# Patient Record
Sex: MALE | Race: WHITE | Employment: OTHER
[De-identification: names, ages, dates, MRNs, and addresses within clinical notes are randomized per-mention and may not be internally consistent; named-entity substitution may affect disease eponyms.]

---

## 2024-02-20 ENCOUNTER — OFFICE VISIT (OUTPATIENT)
Dept: INTERNAL MEDICINE CLINIC | Facility: CLINIC | Age: 79
End: 2024-02-20

## 2024-02-20 VITALS
WEIGHT: 163 LBS | BODY MASS INDEX: 24.71 KG/M2 | TEMPERATURE: 98.7 F | HEIGHT: 68 IN | DIASTOLIC BLOOD PRESSURE: 78 MMHG | HEART RATE: 87 BPM | SYSTOLIC BLOOD PRESSURE: 130 MMHG | OXYGEN SATURATION: 97 %

## 2024-02-20 DIAGNOSIS — E05.90 HYPERTHYROIDISM: Primary | ICD-10-CM

## 2024-02-20 DIAGNOSIS — E78.2 MIXED HYPERLIPIDEMIA: ICD-10-CM

## 2024-02-20 PROBLEM — R03.0 ELEVATED BLOOD PRESSURE READING WITHOUT DIAGNOSIS OF HYPERTENSION: Status: ACTIVE | Noted: 2022-02-08

## 2024-02-20 PROBLEM — R74.8 ABNORMAL LIVER ENZYMES: Status: ACTIVE | Noted: 2018-11-06

## 2024-02-20 PROBLEM — E78.5 HYPERLIPIDEMIA: Status: ACTIVE | Noted: 2022-02-08

## 2024-02-20 PROBLEM — R73.9 HYPERGLYCEMIA: Status: ACTIVE | Noted: 2018-11-06

## 2024-02-20 RX ORDER — ATORVASTATIN CALCIUM 10 MG/1
10 TABLET, FILM COATED ORAL
COMMUNITY
Start: 2022-10-19 | End: 2024-02-20 | Stop reason: SDUPTHER

## 2024-02-20 RX ORDER — ATORVASTATIN CALCIUM 10 MG/1
TABLET, FILM COATED ORAL
Qty: 30 TABLET | Refills: 3 | Status: SHIPPED | OUTPATIENT
Start: 2024-02-20

## 2024-02-20 RX ORDER — METHIMAZOLE 5 MG/1
5 TABLET ORAL
COMMUNITY
Start: 2023-09-26

## 2024-02-20 SDOH — ECONOMIC STABILITY: HOUSING INSECURITY
IN THE LAST 12 MONTHS, WAS THERE A TIME WHEN YOU DID NOT HAVE A STEADY PLACE TO SLEEP OR SLEPT IN A SHELTER (INCLUDING NOW)?: NO

## 2024-02-20 SDOH — ECONOMIC STABILITY: FOOD INSECURITY: WITHIN THE PAST 12 MONTHS, YOU WORRIED THAT YOUR FOOD WOULD RUN OUT BEFORE YOU GOT MONEY TO BUY MORE.: NEVER TRUE

## 2024-02-20 SDOH — ECONOMIC STABILITY: FOOD INSECURITY: WITHIN THE PAST 12 MONTHS, THE FOOD YOU BOUGHT JUST DIDN'T LAST AND YOU DIDN'T HAVE MONEY TO GET MORE.: NEVER TRUE

## 2024-02-20 SDOH — ECONOMIC STABILITY: INCOME INSECURITY: HOW HARD IS IT FOR YOU TO PAY FOR THE VERY BASICS LIKE FOOD, HOUSING, MEDICAL CARE, AND HEATING?: NOT HARD AT ALL

## 2024-02-20 ASSESSMENT — ENCOUNTER SYMPTOMS
NAUSEA: 0
CONSTIPATION: 0
VOMITING: 0
ABDOMINAL PAIN: 0
SHORTNESS OF BREATH: 0
DIARRHEA: 0
WHEEZING: 0
SINUS PAIN: 0

## 2024-02-20 ASSESSMENT — PATIENT HEALTH QUESTIONNAIRE - PHQ9
1. LITTLE INTEREST OR PLEASURE IN DOING THINGS: 0
SUM OF ALL RESPONSES TO PHQ QUESTIONS 1-9: 0
SUM OF ALL RESPONSES TO PHQ QUESTIONS 1-9: 0
2. FEELING DOWN, DEPRESSED OR HOPELESS: 0
SUM OF ALL RESPONSES TO PHQ QUESTIONS 1-9: 0
SUM OF ALL RESPONSES TO PHQ9 QUESTIONS 1 & 2: 0
SUM OF ALL RESPONSES TO PHQ QUESTIONS 1-9: 0

## 2024-02-20 NOTE — PROGRESS NOTES
Eloy was seen today for established new doctor.    Diagnoses and all orders for this visit:    Hyperthyroidism    Mixed hyperlipidemia  -     Lipid Panel; Future  -     Comprehensive Metabolic Panel; Future  -     CBC; Future    Other orders  -     atorvastatin (LIPITOR) 10 MG tablet; Takes  one tablet three times per week          Eloy Rooney is a 78 y.o. male    Chief Complaint   Patient presents with    Established New Doctor     His PCP retired     Doing pretty well  Is active socially and physically   Recent lumbar back injections and now rt hip pain. Going to see hip doctor soon.    Wt Readings from Last 3 Encounters:   24 73.9 kg (163 lb)       No results found for any previous visit.        Past Medical History:   Diagnosis Date    Graves' disease     Hyperthyroidism        History reviewed. No pertinent family history.     Social History     Socioeconomic History    Marital status:      Spouse name: Not on file    Number of children: Not on file    Years of education: Not on file    Highest education level: Not on file   Occupational History    Not on file   Tobacco Use    Smoking status: Former     Types: Cigars     Start date: 1969     Quit date: 2009     Years since quittin.5    Smokeless tobacco: Never   Vaping Use    Vaping Use: Never used   Substance and Sexual Activity    Alcohol use: Not Currently     Comment: 15 years    Drug use: Never    Sexual activity: Not on file   Other Topics Concern    Not on file   Social History Narrative    Not on file     Social Determinants of Health     Financial Resource Strain: Low Risk  (2024)    Overall Financial Resource Strain (CARDIA)     Difficulty of Paying Living Expenses: Not hard at all   Food Insecurity: No Food Insecurity (2024)    Hunger Vital Sign     Worried About Running Out of Food in the Last Year: Never true     Ran Out of Food in the Last Year: Never true   Transportation Needs: Unknown (2024)

## 2024-02-28 ENCOUNTER — TELEPHONE (OUTPATIENT)
Dept: INTERNAL MEDICINE CLINIC | Facility: CLINIC | Age: 79
End: 2024-02-28

## 2024-02-28 DIAGNOSIS — E05.90 HYPERTHYROIDISM: Primary | ICD-10-CM

## 2024-02-29 ENCOUNTER — NURSE ONLY (OUTPATIENT)
Dept: INTERNAL MEDICINE CLINIC | Facility: CLINIC | Age: 79
End: 2024-02-29

## 2024-02-29 DIAGNOSIS — E05.90 HYPERTHYROIDISM: ICD-10-CM

## 2024-02-29 LAB — TSH, 3RD GENERATION: 2.86 UIU/ML (ref 0.36–3.74)

## 2024-03-28 ENCOUNTER — OFFICE VISIT (OUTPATIENT)
Dept: ORTHOPEDIC SURGERY | Age: 79
End: 2024-03-28
Payer: MEDICARE

## 2024-03-28 VITALS — BODY MASS INDEX: 25.65 KG/M2 | WEIGHT: 163.4 LBS | HEIGHT: 67 IN

## 2024-03-28 DIAGNOSIS — M16.11 PRIMARY OSTEOARTHRITIS OF RIGHT HIP: ICD-10-CM

## 2024-03-28 DIAGNOSIS — M25.551 RIGHT HIP PAIN: Primary | ICD-10-CM

## 2024-03-28 PROCEDURE — 4004F PT TOBACCO SCREEN RCVD TLK: CPT | Performed by: ORTHOPAEDIC SURGERY

## 2024-03-28 PROCEDURE — G8419 CALC BMI OUT NRM PARAM NOF/U: HCPCS | Performed by: ORTHOPAEDIC SURGERY

## 2024-03-28 PROCEDURE — 99204 OFFICE O/P NEW MOD 45 MIN: CPT | Performed by: ORTHOPAEDIC SURGERY

## 2024-03-28 PROCEDURE — 1123F ACP DISCUSS/DSCN MKR DOCD: CPT | Performed by: ORTHOPAEDIC SURGERY

## 2024-03-28 PROCEDURE — G8484 FLU IMMUNIZE NO ADMIN: HCPCS | Performed by: ORTHOPAEDIC SURGERY

## 2024-03-28 PROCEDURE — G8427 DOCREV CUR MEDS BY ELIG CLIN: HCPCS | Performed by: ORTHOPAEDIC SURGERY

## 2024-03-28 RX ORDER — CELECOXIB 200 MG/1
200 CAPSULE ORAL 2 TIMES DAILY
Qty: 60 CAPSULE | Refills: 3 | Status: SHIPPED | OUTPATIENT
Start: 2024-03-28

## 2024-03-28 NOTE — PROGRESS NOTES
anti-inflammatory drugs (NSAIDs). We discussed risks associated with their use, including GI tract or other bleeding, and also some cardiac risk. Instructions were given to discontinue the NSAID if there is any sign of GI bleed, chest pain, or shortness of breath.    ----HECTOR - Today we discussed right hip replacement surgery.  They are aware of the 1% risk of dislocation and 1% risk of infection.  They were also informed of the possibility of stroke, heart attack and blood clot-any of these which could result in further hospitalization or death. I reviewed the procedure as well as the pre and post-operative process at length and written information was provided for further review. Implant selection options were discussed with the patient as well. This will be scheduled and arranged.    4--this is a chronic illness/condition with exacerbation

## 2024-04-01 DIAGNOSIS — M16.11 PRIMARY OSTEOARTHRITIS OF RIGHT HIP: Primary | ICD-10-CM

## 2024-04-02 ENCOUNTER — TELEPHONE (OUTPATIENT)
Dept: ORTHOPEDIC SURGERY | Age: 79
End: 2024-04-02

## 2024-04-02 NOTE — TELEPHONE ENCOUNTER
Answered all patients questions regarding appts for surgery & information about surgery. Patient will update us if anymore questions.

## 2024-04-05 ENCOUNTER — PREP FOR PROCEDURE (OUTPATIENT)
Dept: ORTHOPEDIC SURGERY | Age: 79
End: 2024-04-05

## 2024-04-05 DIAGNOSIS — M16.11 PRIMARY OSTEOARTHRITIS OF RIGHT HIP: Primary | ICD-10-CM

## 2024-04-05 RX ORDER — SODIUM CHLORIDE 0.9 % (FLUSH) 0.9 %
5-40 SYRINGE (ML) INJECTION PRN
Status: CANCELLED | OUTPATIENT
Start: 2024-04-05

## 2024-04-05 RX ORDER — SODIUM CHLORIDE 0.9 % (FLUSH) 0.9 %
5-40 SYRINGE (ML) INJECTION EVERY 12 HOURS SCHEDULED
Status: CANCELLED | OUTPATIENT
Start: 2024-04-05

## 2024-04-05 RX ORDER — ACETAMINOPHEN 325 MG/1
1000 TABLET ORAL ONCE
Status: CANCELLED | OUTPATIENT
Start: 2024-04-05 | End: 2024-04-05

## 2024-04-05 RX ORDER — SODIUM CHLORIDE 9 MG/ML
INJECTION, SOLUTION INTRAVENOUS PRN
Status: CANCELLED | OUTPATIENT
Start: 2024-04-05

## 2024-04-08 ENCOUNTER — HOSPITAL ENCOUNTER (OUTPATIENT)
Dept: REHABILITATION | Age: 79
Discharge: HOME | End: 2024-04-11
Payer: MEDICARE

## 2024-04-08 ENCOUNTER — HOSPITAL ENCOUNTER (OUTPATIENT)
Dept: SURGERY | Age: 79
Discharge: HOME | End: 2024-04-11
Payer: MEDICARE

## 2024-04-08 VITALS
RESPIRATION RATE: 16 BRPM | HEIGHT: 67 IN | TEMPERATURE: 97.2 F | WEIGHT: 163.5 LBS | HEART RATE: 81 BPM | SYSTOLIC BLOOD PRESSURE: 146 MMHG | DIASTOLIC BLOOD PRESSURE: 83 MMHG | OXYGEN SATURATION: 96 % | BODY MASS INDEX: 25.66 KG/M2

## 2024-04-08 DIAGNOSIS — M16.11 PRIMARY OSTEOARTHRITIS OF RIGHT HIP: ICD-10-CM

## 2024-04-08 LAB
ANION GAP SERPL CALC-SCNC: 7 MMOL/L (ref 2–11)
APTT PPP: 27.3 SEC (ref 23.3–37.4)
BASOPHILS # BLD: 0 K/UL (ref 0–0.2)
BASOPHILS NFR BLD: 1 % (ref 0–2)
BUN SERPL-MCNC: 7 MG/DL (ref 8–23)
CALCIUM SERPL-MCNC: 9.6 MG/DL (ref 8.3–10.4)
CHLORIDE SERPL-SCNC: 102 MMOL/L (ref 103–113)
CO2 SERPL-SCNC: 30 MMOL/L (ref 21–32)
CREAT SERPL-MCNC: 0.74 MG/DL (ref 0.8–1.5)
DIFFERENTIAL METHOD BLD: ABNORMAL
EKG ATRIAL RATE: 79 BPM
EKG DIAGNOSIS: NORMAL
EKG P AXIS: 40 DEGREES
EKG P-R INTERVAL: 164 MS
EKG Q-T INTERVAL: 360 MS
EKG QRS DURATION: 76 MS
EKG QTC CALCULATION (BAZETT): 412 MS
EKG R AXIS: 48 DEGREES
EKG T AXIS: 40 DEGREES
EKG VENTRICULAR RATE: 79 BPM
EOSINOPHIL # BLD: 0.1 K/UL (ref 0–0.8)
EOSINOPHIL NFR BLD: 1 % (ref 0.5–7.8)
ERYTHROCYTE [DISTWIDTH] IN BLOOD BY AUTOMATED COUNT: 13.9 % (ref 11.9–14.6)
EST. AVERAGE GLUCOSE BLD GHB EST-MCNC: 105 MG/DL
GLUCOSE SERPL-MCNC: 109 MG/DL (ref 65–100)
HBA1C MFR BLD: 5.3 % (ref 4.8–5.6)
HCT VFR BLD AUTO: 54 % (ref 41.1–50.3)
HGB BLD-MCNC: 18.7 G/DL (ref 13.6–17.2)
IMM GRANULOCYTES # BLD AUTO: 0 K/UL (ref 0–0.5)
IMM GRANULOCYTES NFR BLD AUTO: 0 % (ref 0–5)
INR PPP: 1.1
LYMPHOCYTES # BLD: 1.2 K/UL (ref 0.5–4.6)
LYMPHOCYTES NFR BLD: 19 % (ref 13–44)
MCH RBC QN AUTO: 30.9 PG (ref 26.1–32.9)
MCHC RBC AUTO-ENTMCNC: 34.6 G/DL (ref 31.4–35)
MCV RBC AUTO: 89.3 FL (ref 82–102)
MONOCYTES # BLD: 0.5 K/UL (ref 0.1–1.3)
MONOCYTES NFR BLD: 7 % (ref 4–12)
NEUTS SEG # BLD: 4.7 K/UL (ref 1.7–8.2)
NEUTS SEG NFR BLD: 72 % (ref 43–78)
NRBC # BLD: 0 K/UL (ref 0–0.2)
PLATELET # BLD AUTO: 219 K/UL (ref 150–450)
PMV BLD AUTO: 10.7 FL (ref 9.4–12.3)
POTASSIUM SERPL-SCNC: 4.2 MMOL/L (ref 3.5–5.1)
PROTHROMBIN TIME: 13.4 SEC (ref 11.3–14.9)
RBC # BLD AUTO: 6.05 M/UL (ref 4.23–5.6)
SODIUM SERPL-SCNC: 139 MMOL/L (ref 136–146)
WBC # BLD AUTO: 6.5 K/UL (ref 4.3–11.1)

## 2024-04-08 PROCEDURE — 93010 ELECTROCARDIOGRAM REPORT: CPT | Performed by: INTERNAL MEDICINE

## 2024-04-08 PROCEDURE — 94760 N-INVAS EAR/PLS OXIMETRY 1: CPT

## 2024-04-08 PROCEDURE — 93005 ELECTROCARDIOGRAM TRACING: CPT | Performed by: ANESTHESIOLOGY

## 2024-04-08 PROCEDURE — 85025 COMPLETE CBC W/AUTO DIFF WBC: CPT

## 2024-04-08 PROCEDURE — 85610 PROTHROMBIN TIME: CPT

## 2024-04-08 PROCEDURE — 80048 BASIC METABOLIC PNL TOTAL CA: CPT

## 2024-04-08 PROCEDURE — 83036 HEMOGLOBIN GLYCOSYLATED A1C: CPT

## 2024-04-08 PROCEDURE — 97161 PT EVAL LOW COMPLEX 20 MIN: CPT

## 2024-04-08 PROCEDURE — 87641 MR-STAPH DNA AMP PROBE: CPT

## 2024-04-08 PROCEDURE — 85730 THROMBOPLASTIN TIME PARTIAL: CPT

## 2024-04-08 ASSESSMENT — PROMIS GLOBAL HEALTH SCALE
WHO IS THE PERSON COMPLETING THE PROMIS V1.1 SURVEY?: SELF
SUM OF RESPONSES TO QUESTIONS 3, 6, 7, & 8: 13
SUM OF RESPONSES TO QUESTIONS 2, 4, 5, & 10: 4
IN GENERAL, HOW WOULD YOU RATE YOUR SATISFACTION WITH YOUR SOCIAL ACTIVITIES AND RELATIONSHIPS [ON A SCALE OF 1 (POOR) TO 5 (EXCELLENT)]?: POOR
IN GENERAL, WOULD YOU SAY YOUR QUALITY OF LIFE IS...[ON A SCALE OF 1 (POOR) TO 5 (EXCELLENT)]: POOR
IN THE PAST 7 DAYS, HOW WOULD YOU RATE YOUR FATIGUE ON AVERAGE [ON A SCALE FROM 1 (NONE) TO 5 (VERY SEVERE)]?: VERY SEVERE
IN GENERAL, WOULD YOU SAY YOUR HEALTH IS...[ON A SCALE OF 1 (POOR) TO 5 (EXCELLENT)]: FAIR
IN THE PAST 7 DAYS, HOW WOULD YOU RATE YOUR PAIN ON AVERAGE [ON A SCALE FROM 0 (NO PAIN) TO 10 (WORST IMAGINABLE PAIN)]?: 10 WORST IMAGINABLE PAIN
IN GENERAL, HOW WOULD YOU RATE YOUR PHYSICAL HEALTH [ON A SCALE OF 1 (POOR) TO 5 (EXCELLENT)]?: POOR
TO WHAT EXTENT ARE YOU ABLE TO CARRY OUT YOUR EVERYDAY PHYSICAL ACTIVITIES SUCH AS WALKING, CLIMBING STAIRS, CARRYING GROCERIES, OR MOVING A CHAIR [ON A SCALE OF 1 (NOT AT ALL) TO 5 (COMPLETELY)]?: NOT AT ALL
IN GENERAL, HOW WOULD YOU RATE YOUR MENTAL HEALTH, INCLUDING YOUR MOOD AND YOUR ABILITY TO THINK [ON A SCALE OF 1 (POOR) TO 5 (EXCELLENT)]?: POOR
IN THE PAST 7 DAYS, HOW OFTEN HAVE YOU BEEN BOTHERED BY EMOTIONAL PROBLEMS, SUCH AS FEELING ANXIOUS, DEPRESSED, OR IRRITABLE [ON A SCALE FROM 1 (NEVER) TO 5 (ALWAYS)]?: ALWAYS
IN GENERAL, PLEASE RATE HOW WELL YOU CARRY OUT YOUR USUAL SOCIAL ACTIVITIES (INCLUDES ACTIVITIES AT HOME, AT WORK, AND IN YOUR COMMUNITY, AND RESPONSIBILITIES AS A PARENT, CHILD, SPOUSE, EMPLOYEE, FRIEND, ETC) [ON A SCALE OF 1 (POOR) TO 5 (EXCELLENT)]?: POOR
HOW IS THE PROMIS V1.1 BEING ADMINISTERED?: PAPER

## 2024-04-08 ASSESSMENT — HOOS JR
SITTING: MODERATE
HOOS JR RAW SCORE: 8
WALKING ON UNEVEN SURFACE: MILD
HOOS JR RAW SCORE: 8
BENDING TO THE FLOOR TO PICK UP OBJECT: MILD
HOOS JR TOTAL INTERVAL SCORE: 64.664
LYING IN BED (TURNING OVER, MAINTAINING HIP POSITION): MODERATE
GOING UP OR DOWN STAIRS: MILD
RISING FROM SITTING: MILD

## 2024-04-08 ASSESSMENT — PAIN SCALES - GENERAL
PAINLEVEL_OUTOF10: 10
PAINLEVEL_OUTOF10: 4

## 2024-04-08 ASSESSMENT — PAIN DESCRIPTION - ORIENTATION
ORIENTATION: RIGHT
ORIENTATION: RIGHT

## 2024-04-08 ASSESSMENT — PAIN DESCRIPTION - LOCATION
LOCATION: HIP
LOCATION: HIP

## 2024-04-08 ASSESSMENT — PULMONARY FUNCTION TESTS
FEV1 (LITERS): 2.93
FEV1 (%PREDICTED): 115

## 2024-04-08 NOTE — PROGRESS NOTES
Hyperthyroidism, Lumbar radiculopathy, Polycythemia, Primary osteoarthritis of right hip, and Vision impairment.  Mr. Rooney  has a past surgical history that includes eye surgery (Left); Tonsillectomy; Appendectomy; sinus surgery; Facial reconstruction surgery (Right); and Multiple tooth extractions.    Allergies:  Epinephrine and Heparin    Current Medications:  Refer to pre-assessment nursing note    Home Set-Up/Prior Level of Function:  Lives With: Spouse  Home Layout: One level  Home Access: Stairs to enter without rails  Bathroom Shower/Tub: Tub/Shower unit  Home Equipment: Cane    Dominant Side:  Dominant Hand: : Left      Current Functional Status:   Ambulation:  [] Independent  [x] Walk Indoors Only  [] Walk Outdoors  [] Use Assistive Device  [] Use Wheelchair Only Dressing:  [] Independent  Requires Assistance from Someone for:  [] Sock/Shoes  [] Pants  [x] Everything   Bathing/Showering:   [] Independent  [] Requires Assistance from Someone  [x] Sponge Bath Only Household Activities:  [] Routine house and yard work  [] Light Housework Only  [x] None     Objective:   PAIN:   Pre-Treatment Pain  Pain Assessment: 0-10  Pain Level: 10  Pain Location: Hip  Pain Orientation: Right    Post Treatment: 10    Outcome Measure:   HOOS-JR:  Total Raw Score (0-24 Scale): 8  JR Neftaly. Hip Survey 1. Going up or down stairs 2. Walking on an uneven surface 3. Rising from sitting  4. Bending to the floor/ an object 5. Lying in bed (turning over, maintaining hip position) 6. Sitting  HOOS Jr. Raw Score   4/8/2024   3:52 PM 1 1 1 1 2 2 8        KOOS-JR:          No data to display                 LOWER EXTREMITY GROSS EVALUATION:  RIGHT LE   Within Functional Limits   Abnormal   Comments   Strength [] []  4+/5@hip   Range of Motion [x] []        LEFT LE Within Functional Limits   Abnormal   Comments   Strength [x] []     Range of Motion [x] []       UPPER EXTREMITY GROSS EVALUATION:     Within Functional Limits

## 2024-04-09 DIAGNOSIS — M16.11 PRIMARY OSTEOARTHRITIS OF RIGHT HIP: ICD-10-CM

## 2024-04-09 LAB
MRSA DNA SPEC QL NAA+PROBE: NOT DETECTED
S AUREUS CPE NOSE QL NAA+PROBE: NOT DETECTED

## 2024-04-09 RX ORDER — CELECOXIB 200 MG/1
200 CAPSULE ORAL 2 TIMES DAILY
Qty: 60 CAPSULE | Refills: 3 | Status: SHIPPED | OUTPATIENT
Start: 2024-04-09

## 2024-04-09 NOTE — PROGRESS NOTES
04/08/24 1130   Treatment   Treatment Type Bedside spirometry   Oxygen Therapy/Pulse Ox   O2 Therapy Room air   Pulse 87   SpO2 97 %   Pulse Oximeter Device Mode Intermittent   $Pulse Oximeter $Spot check (single)   Bedside Spirometry   FEV-1/Actual (Liters) 2.93 Liters   FEV-1/Predicted (Liters) 115 Liters     Initial respiratory Assessment completed with pt. Pt was interviewed and evaluated in [facility] prior to [place].  Patient ID:  Eloy Rooney  [id number]  78 y.o.  1945  Surgeon: Dr. Shipley  Date of Surgery: [unfilled]6/3/2024  Procedure: Total Right Hip Arthroplasty  Primary Care Physician: Meng Yun,  [phone]  Specialists:    Pt taught proper COUGH technique  IS REVIEWED WITH PT AS WELL AS BENEFITS OF USING IS IN SEDENTARY PTS.  DIAPHRAGMATIC BREATHING EXERCISE INSTRUCTIONS GIVEN    History of smoking:   QUIT DRINKING 2/3/2023 PER PT AND STILL SMOKING SOME WITH FILTER  SMOKING CESSATION GIVEN TO PT                 Quit date:         Secondhand smoke:DENIES    Past procedures with Oxygen desaturation or delayed awakening:DENIES     Respiratory history:DENIES SOB                                HX OF PNA AT AGE 16  HX OF PERSISTENT COUGH                                   Respiratory meds:  DENIES    FAMILY PRESENT:            WIFE  PAST SLEEP STUDY:                      DENIES  HX OF KIRTI:                        YES                     KIRTI assessment:     DANGERS OF UNTREATED KIRTI EXPLAINED TO PT.                                          SLEEPS ON SIDE       &      BACK         &       STOMACH  PHYSICAL EXAM   Body mass index is 25.99 kg/m².   Vitals:    04/08/24 1309   BP: (!) 146/83   Pulse: 81   Resp: 16   Temp: 97.2 °F (36.2 °C)   SpO2: 96%     Neck circumference: 40     cm    Loud snoring:                                                 YES            Witnessed apnea or wakening gasping or choking:        DENIES       Awakens with headaches:                                      
  How to Use Your Incentive Spirometer       About Your Incentive Spirometer  An incentive spirometer is a device that will expand your lungs by helping you to breathe more deeply and fully. The parts of your incentive spirometer are labeled in Figure 1.    Using your incentive spirometer  When you're using your incentive spirometer, make sure to breathe through your mouth. If you breathe through your nose, the incentive spirometer won't work properly. You can hold your nose if you have trouble. DO NOT BLOW INTO THE DEVICE. If you feel dizzy at any time, stop and rest. Try again at a later time.  Sit upright in a chair or in bed. Hold the incentive spirometer at eye level.   Put the mouthpiece in your mouth and close your lips tightly around it. Slowly breathe out (exhale) completely.  Breathe in (inhale) slowly through your mouth as deeply as you can. As you take the breath, you will see the piston rise inside the large column. While the piston rises, the indicator on the right should move upwards. It should stay in between the 2 arrows (see Figure 1).  Try to get the piston as high as you can, while keeping the indicator between the arrows. If the indicator doesn't stay between the arrows, you're breathing either too fast or too slow.  When you get it as high as you can, hold your breath for 10 seconds, or as long as possible. While you're holding your breath, the piston will slowly fall to the base of the spirometer.  Once the piston reaches the bottom of the spirometer, breathe out slowly through your mouth. Rest for a few seconds.  Repeat 10 times. Try to get the piston to the same level with each breath.  After each set of 10 breaths, try to cough as coughing will help loosen or clear any mucus in your lungs.  Put the marker at the level the piston reached on your incentive spirometer. This will be your goal next time.  Repeat these steps every hour that you're awake.  Cover the mouthpiece of the incentive 
  Patient verified name and .    Order for consent not found in EHR and unable to match consent with case posting; patient verified.     Type 3 surgery, [facility] assessment complete.    Labs per surgeon: CBC,BMP, PT/INR, Hgb A1c ; results are within anesthesia guidelines.  Labs per anesthesia protocol: no additional  EKG:completed today per protocol and within anesthesia guidelines    MRSA/MSSA swab collected; pharmacy to review and dose antibiotic as appropriate.     Hospital approved surgical skin cleanser and instructions to return bottle on DOS given per hospital policy.    Patient provided with handouts including Guide to Surgery, Pain Management, Hand Hygiene, Blood Transfusion Education, and [facility] Brochure.    Patient answered medical/surgical history questions at their best of ability. All prior to admission medications documented in [facility]. Original medication prescription bottle not visualized during patient appointment.     Patient instructed to hold all vitamins 3 weeks prior to surgery and NSAIDS 5 days prior to surgery.     Patient teach back successful and patient demonstrates knowledge of instruction.     
 Latest Reference Range & Units 04/08/24 12:27   Sodium 136 - 146 mmol/L 139   Potassium 3.5 - 5.1 mmol/L 4.2   Chloride 103 - 113 mmol/L 102 (L)   CO2 21 - 32 mmol/L 30   BUN,BUNPL 8 - 23 MG/DL 7 (L)   Creatinine 0.8 - 1.5 MG/DL 0.74 (L)   Anion Gap 2 - 11 mmol/L 7   Est, Glom Filt Rate >60 ml/min/1.73m2 >90   Glucose, Random 65 - 100 mg/dL 109 (H)   CALCIUM, SERUM, 869000 8.3 - 10.4 MG/DL 9.6   Hemoglobin A1C 4.8 - 5.6 % 5.3   eAG (mg/dL) mg/dL 105   WBC 4.3 - 11.1 K/uL 6.5   RBC 4.23 - 5.6 M/uL 6.05 (H)   Hemoglobin Quant 13.6 - 17.2 g/dL 18.7 (H)   Hematocrit 41.1 - 50.3 % 54.0 (H)   MCV 82.0 - 102.0 FL 89.3   MCH 26.1 - 32.9 PG 30.9   MCHC 31.4 - 35.0 g/dL 34.6   MPV 9.4 - 12.3 FL 10.7   RDW 11.9 - 14.6 % 13.9   Platelet Count 150 - 450 K/uL 219   Neutrophils, Automated 43 - 78 % 72   Lymphocyte % 13 - 44 % 19   Monocytes % 4.0 - 12.0 % 7   Eosinophils % 0.5 - 7.8 % 1   Basophils % 0.0 - 2.0 % 1   Neutrophils Absolute 1.7 - 8.2 K/UL 4.7   Lymphocytes Absolute 0.5 - 4.6 K/UL 1.2   Monocytes Absolute 0.1 - 1.3 K/UL 0.5   Eosinophils Absolute 0.0 - 0.8 K/UL 0.1   Basophils Absolute 0.0 - 0.2 K/UL 0.0   Differential Type -   AUTOMATED   Immature Granulocytes % 0.0 - 5.0 % 0   Nucleated Red Blood Cells 0.0 - 0.2 K/uL 0.00   Immature Granulocytes Absolute 0.0 - 0.5 K/UL 0.0   Prothrombin Time 11.3 - 14.9 sec 13.4   INR -   1.1   APTT 23.3 - 37.4 SEC 27.3   (L): Data is abnormally low  (H): Data is abnormally high  
PLEASE CONTINUE TAKING ALL PRESCRIPTION MEDICATIONS UP TO THE DAY OF SURGERY UNLESS OTHERWISE DIRECTED BELOW.    DISCONTINUE all vitamins, herbals and supplements 3 weeks prior to surgery. DISCONTINUE Non-Steroidal Anti-Inflammatory (NSAIDS) such as Advil, Ibuprofen, Motrin, Naproxen and Aleve 5 days prior to surgery.       Home Medications to take  the day of surgery    Methimazole           Home Medications to Hold- please continue all other medications except these.            Comments   On the day before surgery please take Acetaminophen 1000mg in the morning and then again before bed. You may substitute for Tylenol 650 mg.      Bring Dynahex wash and Incentive Spirometer with you to hospital on the day of surgery.            Please do not bring home medications with you on the day of surgery unless otherwise directed by your nurse.  If you are instructed to bring home medications, please give them to your nurse as they will be administered by the nursing staff.    If you have any questions, please call [facility] [phone].    A copy of this note was provided to the patient for reference.    
Reactions    Epinephrine Other (See Comments)     Passed out     Heparin Other (See Comments)     Avoids heparin/ family history of uncle who had adverse reaction to Heparin          Objective:     Physical Exam:   Patient Vitals for the past 24 hrs:   Temp Pulse Resp BP SpO2   04/08/24 1309 97.2 °F (36.2 °C) 81 16 (!) 146/83 96 %   04/08/24 1130 -- 87 -- -- 97 %       ECG:    Encounter Date: 04/08/24   EKG 12 Lead   Result Value    Ventricular Rate 79    Atrial Rate 79    P-R Interval 164    QRS Duration 76    Q-T Interval 360    QTc Calculation (Bazett) 412    P Axis 40    R Axis 48    T Axis 40    Diagnosis      Normal sinus rhythm with sinus arrhythmia  Normal ECG  No previous ECGs available  Confirmed by BRENDON POLLARD ()MAYTE ([id number]) on 4/8/2024 12:55:57 PM         Data Review:   Labs:   Recent Results (from the past 24 hour(s))   EKG 12 Lead    Collection Time: 04/08/24 12:09 PM   Result Value Ref Range    Ventricular Rate 79 BPM    Atrial Rate 79 BPM    P-R Interval 164 ms    QRS Duration 76 ms    Q-T Interval 360 ms    QTc Calculation (Bazett) 412 ms    P Axis 40 degrees    R Axis 48 degrees    T Axis 40 degrees    Diagnosis       Normal sinus rhythm with sinus arrhythmia  Normal ECG  No previous ECGs available  Confirmed by BRENDON POLLARD ()MAYTE ([id number]) on 4/8/2024 12:55:57 PM     Protime-INR    Collection Time: 04/08/24 12:27 PM   Result Value Ref Range    Protime 13.4 11.3 - 14.9 sec    INR 1.1     Hemoglobin A1c    Collection Time: 04/08/24 12:27 PM   Result Value Ref Range    Hemoglobin A1C 5.3 4.8 - 5.6 %    Estimated Avg Glucose 105 mg/dL   CBC with Auto Differential    Collection Time: 04/08/24 12:27 PM   Result Value Ref Range    WBC 6.5 4.3 - 11.1 K/uL    RBC 6.05 (H) 4.23 - 5.6 M/uL    Hemoglobin 18.7 (H) 13.6 - 17.2 g/dL    Hematocrit 54.0 (H) 41.1 - 50.3 %    MCV 89.3 82.0 - 102.0 FL    MCH 30.9 26.1 - 32.9 PG    MCHC 34.6 31.4 - 35.0 g/dL    RDW 13.9 11.9 - 14.6 %    Platelets 219

## 2024-04-09 NOTE — TELEPHONE ENCOUNTER
He is having surgery May 3. He went to [facility] [place] and took his meds with him. He says the Celebrex did not make it back home with him. He is asking to speak to Gabriella about this. He thinks he needs the Celebrex.   He does use [facility] in [place].

## 2024-04-26 ENCOUNTER — OFFICE VISIT (OUTPATIENT)
Dept: ORTHOPEDIC SURGERY | Age: 79
End: 2024-04-26

## 2024-04-26 DIAGNOSIS — M16.11 PRIMARY OSTEOARTHRITIS OF RIGHT HIP: Primary | ICD-10-CM

## 2024-04-26 DIAGNOSIS — Z01.818 ENCOUNTER FOR PRE-OPERATIVE EXAMINATION: ICD-10-CM

## 2024-04-26 PROCEDURE — PREOPEXAM PRE-OP EXAM: Performed by: PHYSICIAN ASSISTANT

## 2024-04-26 NOTE — H&P (VIEW-ONLY)
[facility]  Pre Operative History and Physical Exam    Patient ID:  Eloy Rooney  [id number]  78 y.o.  1945    Today: April 26, 2024           CC:  Right hip pain    HPI:   The patient has end stage arthritis of the right hip. The patient was evaluated and examined during a consultation prior to this office visit.  There have been no changes to the patient's orthopedic condition since the initial consultation. The patient has failed previous conservative treatment for this condition including antiinflammatories , and lifestyle modifications. The necessity for joint replacement is present. The patient will be admitted the day of surgery for right hip replacement    Past Medical/Surgical History:  Past Medical History:   Diagnosis Date    Graves' disease     Hearing loss     left ear    Hyperlipidemia     no meds    Hyperthyroidism     Lumbar radiculopathy     Polycythemia     Primary osteoarthritis of right hip     Vision impairment     \"splintered vision\" r/t thyroid disease     Past Surgical History:   Procedure Laterality Date    APPENDECTOMY      EYE SURGERY Left     FACIAL RECONSTRUCTION SURGERY Right     MULTIPLE TOOTH EXTRACTIONS      all upper teeth removed    SINUS SURGERY      TONSILLECTOMY          Allergies:   Allergies   Allergen Reactions    Epinephrine Other (See Comments)     Passed out     Heparin Other (See Comments)     Avoids heparin/ family history of uncle who had adverse reaction to Heparin        Physical Exam:   General: NAD, Alert, Oriented, Appears their stated age     HEENT: NC/AT    Skin: No rashes, lesions or wounds seen      Psych: normal affect      Heart: Regular Rate, Rhythm     Lungs: unlabored respirations, no wheezing    Abdomen: Soft and non-distended     Ortho: Pain with limited ROM of the right hip    Neuro: no focal defects, moving extremities equally    Lymph: no lymphadenopathy     Meds:   Current Outpatient Medications   Medication Sig

## 2024-04-26 NOTE — PROGRESS NOTES
K/UL Final    Monocytes Absolute 04/08/2024 0.5  0.1 - 1.3 K/UL Final    Eosinophils Absolute 04/08/2024 0.1  0.0 - 0.8 K/UL Final    Basophils Absolute 04/08/2024 0.0  0.0 - 0.2 K/UL Final    Immature Granulocytes Absolute 04/08/2024 0.0  0.0 - 0.5 K/UL Final    Sodium 04/08/2024 139  136 - 146 mmol/L Final    Potassium 04/08/2024 4.2  3.5 - 5.1 mmol/L Final    Chloride 04/08/2024 102 (L)  103 - 113 mmol/L Final    CO2 04/08/2024 30  21 - 32 mmol/L Final    Anion Gap 04/08/2024 7  2 - 11 mmol/L Final    Glucose 04/08/2024 109 (H)  65 - 100 mg/dL Final    Comment: 47 - 60 mg/dl Consistent with, but not fully diagnostic of hypoglycemia.  101 - 125 mg/dl Impaired fasting glucose/consistent with pre-diabetes mellitus  > 126 mg/dl Fasting glucose consistent with overt diabetes mellitus      BUN 04/08/2024 7 (L)  8 - 23 MG/DL Final    Creatinine 04/08/2024 0.74 (L)  0.8 - 1.5 MG/DL Final    Est, Glom Filt Rate 04/08/2024 >90  >60 ml/min/1.73m2 Final    Comment:    Pediatric calculator link: https://www.kidney.org/professionals/kdoqi/gfr_calculatorped     These results are not intended for use in patients <18 years of age.     eGFR results are calculated without a race factor using  the 2021 CKD-EPI equation. Careful clinical correlation is recommended, particularly when comparing to results calculated using previous equations.  The CKD-EPI equation is less accurate in patients with extremes of muscle mass, extra-renal metabolism of creatinine, excessive creatine ingestion, or following therapy that affects renal tubular secretion.      Calcium 04/08/2024 9.6  8.3 - 10.4 MG/DL Final    APTT 04/08/2024 27.3  23.3 - 37.4 SEC Final    Comment: Heparin Therapeutic Range = 74 - 123 seconds  In addition to factor deficiency, monitoring heparin therapy, etc., evaluation of a prolonged aPTT result should include consideration of preanalytic variables such as heparin flush contamination, specimen integrity issues, etc.  PLEASE

## 2024-05-02 ENCOUNTER — ANESTHESIA EVENT (OUTPATIENT)
Dept: SURGERY | Age: 79
End: 2024-05-02
Payer: MEDICARE

## 2024-05-02 NOTE — DISCHARGE INSTRUCTIONS
shower 24 to 48 hours after surgery. Pat the incision dry. Don't swim or take a bath for the first 2 weeks, or until your doctor tells you it is okay.   Exercise    Your physical therapist will teach you exercises to do at home. Always do them as your therapist tells you.     Avoid activities where you might fall.   Ice and elevation    For pain, put ice or a cold pack on the area for 10 to 20 minutes at a time. Put a thin cloth between the ice and your skin. If your doctor recommended cold therapy using a portable machine, follow the instructions that came with the machine.     Your ankle may swell for about 3 months. Prop up your ankle when you ice it or anytime you sit or lie down. Try to keep it above the level of your heart. This will help reduce swelling.   Other instructions    Wear compression stockings if your doctor told you to. These may help to prevent blood clots. Your doctor will tell you how long you need to keep wearing the compression stockings.     Try to prevent falls. To avoid falling:  Arrange furniture so that you will not trip on it.  Get rid of throw rugs, and move electrical cords out of the way.  Walk only in areas with plenty of light.  Put grab bars in showers and bathtubs.  Try to avoid icy or snowy sidewalks. Choose shoes with good traction, or consider using traction devices that attach to your shoes.  Wear shoes with sturdy, flat soles.   Follow-up care is a key part of your treatment and safety. Be sure to make and go to all appointments, and call your doctor if you are having problems. It's also a good idea to know your test results and keep a list of the medicines you take.  When should you call for help?   Call 911 anytime you think you may need emergency care. For example, call if:    You passed out (lost consciousness).     You have severe trouble breathing.     You have sudden chest pain and shortness of breath, or you cough up blood.   Call your doctor now or seek immediate

## 2024-05-03 ENCOUNTER — APPOINTMENT (OUTPATIENT)
Dept: GENERAL RADIOLOGY | Age: 79
DRG: 470 | End: 2024-05-03
Attending: ORTHOPAEDIC SURGERY
Payer: MEDICARE

## 2024-05-03 ENCOUNTER — HOSPITAL ENCOUNTER (INPATIENT)
Age: 79
LOS: 3 days | Discharge: NURSING, REHAB OR LONG-TERM CARE | DRG: 470 | End: 2024-05-06
Attending: ORTHOPAEDIC SURGERY | Admitting: ORTHOPAEDIC SURGERY
Payer: MEDICARE

## 2024-05-03 ENCOUNTER — ANESTHESIA (OUTPATIENT)
Dept: SURGERY | Age: 79
End: 2024-05-03
Payer: MEDICARE

## 2024-05-03 DIAGNOSIS — Z96.641 STATUS POST RIGHT HIP REPLACEMENT: Primary | ICD-10-CM

## 2024-05-03 PROCEDURE — 6370000000 HC RX 637 (ALT 250 FOR IP): Performed by: PHYSICIAN ASSISTANT

## 2024-05-03 PROCEDURE — 0SR904A REPLACEMENT OF RIGHT HIP JOINT WITH CERAMIC ON POLYETHYLENE SYNTHETIC SUBSTITUTE, UNCEMENTED, OPEN APPROACH: ICD-10-PCS | Performed by: ORTHOPAEDIC SURGERY

## 2024-05-03 PROCEDURE — 2580000003 HC RX 258: Performed by: ANESTHESIOLOGY

## 2024-05-03 PROCEDURE — 97535 SELF CARE MNGMENT TRAINING: CPT

## 2024-05-03 PROCEDURE — 72170 X-RAY EXAM OF PELVIS: CPT

## 2024-05-03 PROCEDURE — 2580000003 HC RX 258: Performed by: NURSE ANESTHETIST, CERTIFIED REGISTERED

## 2024-05-03 PROCEDURE — 2580000003 HC RX 258: Performed by: PHYSICIAN ASSISTANT

## 2024-05-03 PROCEDURE — 6360000002 HC RX W HCPCS: Performed by: NURSE ANESTHETIST, CERTIFIED REGISTERED

## 2024-05-03 PROCEDURE — 27130 TOTAL HIP ARTHROPLASTY: CPT | Performed by: ORTHOPAEDIC SURGERY

## 2024-05-03 PROCEDURE — 6360000002 HC RX W HCPCS: Performed by: ANESTHESIOLOGY

## 2024-05-03 PROCEDURE — 3700000000 HC ANESTHESIA ATTENDED CARE: Performed by: ORTHOPAEDIC SURGERY

## 2024-05-03 PROCEDURE — 97161 PT EVAL LOW COMPLEX 20 MIN: CPT

## 2024-05-03 PROCEDURE — 3600000015 HC SURGERY LEVEL 5 ADDTL 15MIN: Performed by: ORTHOPAEDIC SURGERY

## 2024-05-03 PROCEDURE — 6370000000 HC RX 637 (ALT 250 FOR IP): Performed by: ANESTHESIOLOGY

## 2024-05-03 PROCEDURE — 1100000000 HC RM PRIVATE

## 2024-05-03 PROCEDURE — 6360000002 HC RX W HCPCS: Performed by: PHYSICIAN ASSISTANT

## 2024-05-03 PROCEDURE — 2709999900 HC NON-CHARGEABLE SUPPLY: Performed by: ORTHOPAEDIC SURGERY

## 2024-05-03 PROCEDURE — 2500000003 HC RX 250 WO HCPCS: Performed by: NURSE ANESTHETIST, CERTIFIED REGISTERED

## 2024-05-03 PROCEDURE — 3700000001 HC ADD 15 MINUTES (ANESTHESIA): Performed by: ORTHOPAEDIC SURGERY

## 2024-05-03 PROCEDURE — 2720000010 HC SURG SUPPLY STERILE: Performed by: ORTHOPAEDIC SURGERY

## 2024-05-03 PROCEDURE — 7100000001 HC PACU RECOVERY - ADDTL 15 MIN: Performed by: ORTHOPAEDIC SURGERY

## 2024-05-03 PROCEDURE — 97530 THERAPEUTIC ACTIVITIES: CPT

## 2024-05-03 PROCEDURE — 94760 N-INVAS EAR/PLS OXIMETRY 1: CPT

## 2024-05-03 PROCEDURE — 6360000002 HC RX W HCPCS: Performed by: ORTHOPAEDIC SURGERY

## 2024-05-03 PROCEDURE — 2500000003 HC RX 250 WO HCPCS: Performed by: ORTHOPAEDIC SURGERY

## 2024-05-03 PROCEDURE — C1713 ANCHOR/SCREW BN/BN,TIS/BN: HCPCS | Performed by: ORTHOPAEDIC SURGERY

## 2024-05-03 PROCEDURE — 3600000005 HC SURGERY LEVEL 5 BASE: Performed by: ORTHOPAEDIC SURGERY

## 2024-05-03 PROCEDURE — 97165 OT EVAL LOW COMPLEX 30 MIN: CPT

## 2024-05-03 PROCEDURE — 7100000000 HC PACU RECOVERY - FIRST 15 MIN: Performed by: ORTHOPAEDIC SURGERY

## 2024-05-03 PROCEDURE — C1776 JOINT DEVICE (IMPLANTABLE): HCPCS | Performed by: ORTHOPAEDIC SURGERY

## 2024-05-03 DEVICE — HIP STEM - HIGH OFFSET
Type: IMPLANTABLE DEVICE | Site: HIP | Status: FUNCTIONAL
Brand: INSIGNIA

## 2024-05-03 DEVICE — 6.5MM LOW PROFILE HEX SCREW 25MM
Type: IMPLANTABLE DEVICE | Site: HIP | Status: FUNCTIONAL
Brand: TRIDENT II

## 2024-05-03 DEVICE — TRIDENT II TRITANIUM CLUSTER 54E
Type: IMPLANTABLE DEVICE | Site: HIP | Status: FUNCTIONAL
Brand: TRIDENT II

## 2024-05-03 DEVICE — COMPONENT TOT HIP CAPPED LNR POLYETH H2STRYKER] STRYKER CORP]: Type: IMPLANTABLE DEVICE | Site: HIP | Status: FUNCTIONAL

## 2024-05-03 DEVICE — CERAMIC V40 FEMORAL HEAD
Type: IMPLANTABLE DEVICE | Site: HIP | Status: FUNCTIONAL
Brand: BIOLOX

## 2024-05-03 DEVICE — TRIDENT X3 10 DEGREE POLYETHYLENE INSERT
Type: IMPLANTABLE DEVICE | Site: HIP | Status: FUNCTIONAL
Brand: TRIDENT X3 INSERT

## 2024-05-03 RX ORDER — SODIUM CHLORIDE 0.9 % (FLUSH) 0.9 %
5-40 SYRINGE (ML) INJECTION EVERY 12 HOURS SCHEDULED
Status: DISCONTINUED | OUTPATIENT
Start: 2024-05-03 | End: 2024-05-03 | Stop reason: HOSPADM

## 2024-05-03 RX ORDER — SENNA AND DOCUSATE SODIUM 50; 8.6 MG/1; MG/1
1 TABLET, FILM COATED ORAL 2 TIMES DAILY
Status: DISCONTINUED | OUTPATIENT
Start: 2024-05-03 | End: 2024-05-06 | Stop reason: HOSPADM

## 2024-05-03 RX ORDER — TIZANIDINE 2 MG/1
2 TABLET ORAL 3 TIMES DAILY PRN
Qty: 30 TABLET | Refills: 0 | Status: SHIPPED | OUTPATIENT
Start: 2024-05-03

## 2024-05-03 RX ORDER — IPRATROPIUM BROMIDE AND ALBUTEROL SULFATE 2.5; .5 MG/3ML; MG/3ML
1 SOLUTION RESPIRATORY (INHALATION)
Status: DISCONTINUED | OUTPATIENT
Start: 2024-05-03 | End: 2024-05-03 | Stop reason: HOSPADM

## 2024-05-03 RX ORDER — ACETAMINOPHEN 325 MG/1
650 TABLET ORAL EVERY 6 HOURS
Status: DISCONTINUED | OUTPATIENT
Start: 2024-05-03 | End: 2024-05-06 | Stop reason: HOSPADM

## 2024-05-03 RX ORDER — DIPHENHYDRAMINE HCL 25 MG
25 CAPSULE ORAL EVERY 6 HOURS PRN
Status: DISCONTINUED | OUTPATIENT
Start: 2024-05-03 | End: 2024-05-06 | Stop reason: HOSPADM

## 2024-05-03 RX ORDER — SODIUM CHLORIDE 0.9 % (FLUSH) 0.9 %
5-40 SYRINGE (ML) INJECTION EVERY 12 HOURS SCHEDULED
Status: DISCONTINUED | OUTPATIENT
Start: 2024-05-03 | End: 2024-05-06 | Stop reason: HOSPADM

## 2024-05-03 RX ORDER — SODIUM CHLORIDE 0.9 % (FLUSH) 0.9 %
5-40 SYRINGE (ML) INJECTION PRN
Status: DISCONTINUED | OUTPATIENT
Start: 2024-05-03 | End: 2024-05-03 | Stop reason: HOSPADM

## 2024-05-03 RX ORDER — EPHEDRINE SULFATE/0.9% NACL/PF 50 MG/5 ML
SYRINGE (ML) INTRAVENOUS PRN
Status: DISCONTINUED | OUTPATIENT
Start: 2024-05-03 | End: 2024-05-03 | Stop reason: SDUPTHER

## 2024-05-03 RX ORDER — KETOROLAC TROMETHAMINE 30 MG/ML
INJECTION, SOLUTION INTRAMUSCULAR; INTRAVENOUS PRN
Status: DISCONTINUED | OUTPATIENT
Start: 2024-05-03 | End: 2024-05-03 | Stop reason: ALTCHOICE

## 2024-05-03 RX ORDER — LIDOCAINE HYDROCHLORIDE 10 MG/ML
1 INJECTION, SOLUTION INFILTRATION; PERINEURAL
Status: DISCONTINUED | OUTPATIENT
Start: 2024-05-03 | End: 2024-05-03 | Stop reason: HOSPADM

## 2024-05-03 RX ORDER — OXYCODONE HYDROCHLORIDE 5 MG/1
5-10 TABLET ORAL
Qty: 60 TABLET | Refills: 0 | Status: SHIPPED | OUTPATIENT
Start: 2024-05-03 | End: 2024-05-03

## 2024-05-03 RX ORDER — ACETAMINOPHEN 500 MG
1000 TABLET ORAL ONCE
Status: DISCONTINUED | OUTPATIENT
Start: 2024-05-03 | End: 2024-05-03 | Stop reason: SDUPTHER

## 2024-05-03 RX ORDER — MIDAZOLAM HYDROCHLORIDE 2 MG/2ML
2 INJECTION, SOLUTION INTRAMUSCULAR; INTRAVENOUS
Status: DISCONTINUED | OUTPATIENT
Start: 2024-05-03 | End: 2024-05-03 | Stop reason: HOSPADM

## 2024-05-03 RX ORDER — PROPOFOL 10 MG/ML
INJECTION, EMULSION INTRAVENOUS CONTINUOUS PRN
Status: DISCONTINUED | OUTPATIENT
Start: 2024-05-03 | End: 2024-05-03 | Stop reason: SDUPTHER

## 2024-05-03 RX ORDER — FENTANYL CITRATE 50 UG/ML
50 INJECTION, SOLUTION INTRAMUSCULAR; INTRAVENOUS EVERY 5 MIN PRN
Status: DISCONTINUED | OUTPATIENT
Start: 2024-05-03 | End: 2024-05-03 | Stop reason: HOSPADM

## 2024-05-03 RX ORDER — VANCOMYCIN HYDROCHLORIDE 1 G/20ML
INJECTION, POWDER, LYOPHILIZED, FOR SOLUTION INTRAVENOUS PRN
Status: DISCONTINUED | OUTPATIENT
Start: 2024-05-03 | End: 2024-05-03 | Stop reason: ALTCHOICE

## 2024-05-03 RX ORDER — ROPIVACAINE HYDROCHLORIDE 2 MG/ML
INJECTION, SOLUTION EPIDURAL; INFILTRATION; PERINEURAL PRN
Status: DISCONTINUED | OUTPATIENT
Start: 2024-05-03 | End: 2024-05-03 | Stop reason: ALTCHOICE

## 2024-05-03 RX ORDER — DIPHENHYDRAMINE HYDROCHLORIDE 50 MG/ML
25 INJECTION INTRAMUSCULAR; INTRAVENOUS EVERY 6 HOURS PRN
Status: DISCONTINUED | OUTPATIENT
Start: 2024-05-03 | End: 2024-05-06 | Stop reason: HOSPADM

## 2024-05-03 RX ORDER — TRANEXAMIC ACID 100 MG/ML
INJECTION, SOLUTION INTRAVENOUS PRN
Status: DISCONTINUED | OUTPATIENT
Start: 2024-05-03 | End: 2024-05-03 | Stop reason: SDUPTHER

## 2024-05-03 RX ORDER — HYDROMORPHONE HYDROCHLORIDE 1 MG/ML
1 INJECTION, SOLUTION INTRAMUSCULAR; INTRAVENOUS; SUBCUTANEOUS
Status: DISCONTINUED | OUTPATIENT
Start: 2024-05-03 | End: 2024-05-06 | Stop reason: HOSPADM

## 2024-05-03 RX ORDER — ASPIRIN 81 MG/1
81 TABLET ORAL 2 TIMES DAILY
Qty: 70 TABLET | Refills: 0 | Status: SHIPPED | OUTPATIENT
Start: 2024-05-03 | End: 2024-06-07

## 2024-05-03 RX ORDER — ACETAMINOPHEN 500 MG
1000 TABLET ORAL ONCE
Status: COMPLETED | OUTPATIENT
Start: 2024-05-03 | End: 2024-05-03

## 2024-05-03 RX ORDER — HYDROMORPHONE HYDROCHLORIDE 1 MG/ML
0.5 INJECTION, SOLUTION INTRAMUSCULAR; INTRAVENOUS; SUBCUTANEOUS EVERY 10 MIN PRN
Status: DISCONTINUED | OUTPATIENT
Start: 2024-05-03 | End: 2024-05-03 | Stop reason: HOSPADM

## 2024-05-03 RX ORDER — SODIUM CHLORIDE 9 MG/ML
INJECTION, SOLUTION INTRAVENOUS PRN
Status: DISCONTINUED | OUTPATIENT
Start: 2024-05-03 | End: 2024-05-03 | Stop reason: HOSPADM

## 2024-05-03 RX ORDER — MIDAZOLAM HYDROCHLORIDE 1 MG/ML
INJECTION INTRAMUSCULAR; INTRAVENOUS PRN
Status: DISCONTINUED | OUTPATIENT
Start: 2024-05-03 | End: 2024-05-03 | Stop reason: SDUPTHER

## 2024-05-03 RX ORDER — SODIUM CHLORIDE 9 MG/ML
INJECTION, SOLUTION INTRAVENOUS PRN
Status: DISCONTINUED | OUTPATIENT
Start: 2024-05-03 | End: 2024-05-06 | Stop reason: HOSPADM

## 2024-05-03 RX ORDER — OXYCODONE HYDROCHLORIDE 5 MG/1
5 TABLET ORAL
Status: DISCONTINUED | OUTPATIENT
Start: 2024-05-03 | End: 2024-05-03 | Stop reason: HOSPADM

## 2024-05-03 RX ORDER — METHOCARBAMOL 750 MG/1
750 TABLET, FILM COATED ORAL EVERY 8 HOURS PRN
Status: COMPLETED | OUTPATIENT
Start: 2024-05-03 | End: 2024-05-04

## 2024-05-03 RX ORDER — NALOXONE HYDROCHLORIDE 0.4 MG/ML
INJECTION, SOLUTION INTRAMUSCULAR; INTRAVENOUS; SUBCUTANEOUS PRN
Status: DISCONTINUED | OUTPATIENT
Start: 2024-05-03 | End: 2024-05-03 | Stop reason: HOSPADM

## 2024-05-03 RX ORDER — ONDANSETRON 4 MG/1
4 TABLET, ORALLY DISINTEGRATING ORAL EVERY 8 HOURS PRN
Status: DISCONTINUED | OUTPATIENT
Start: 2024-05-03 | End: 2024-05-06 | Stop reason: HOSPADM

## 2024-05-03 RX ORDER — METHIMAZOLE 5 MG/1
5 TABLET ORAL DAILY
Status: CANCELLED | OUTPATIENT
Start: 2024-05-03

## 2024-05-03 RX ORDER — HALOPERIDOL 5 MG/ML
1 INJECTION INTRAMUSCULAR
Status: DISCONTINUED | OUTPATIENT
Start: 2024-05-03 | End: 2024-05-03 | Stop reason: HOSPADM

## 2024-05-03 RX ORDER — DEXAMETHASONE SODIUM PHOSPHATE 10 MG/ML
INJECTION INTRAMUSCULAR; INTRAVENOUS PRN
Status: DISCONTINUED | OUTPATIENT
Start: 2024-05-03 | End: 2024-05-03 | Stop reason: SDUPTHER

## 2024-05-03 RX ORDER — ONDANSETRON 2 MG/ML
4 INJECTION INTRAMUSCULAR; INTRAVENOUS
Status: COMPLETED | OUTPATIENT
Start: 2024-05-03 | End: 2024-05-03

## 2024-05-03 RX ORDER — ACETAMINOPHEN 500 MG
500 TABLET ORAL EVERY 6 HOURS PRN
COMMUNITY

## 2024-05-03 RX ORDER — ASPIRIN 81 MG/1
81 TABLET ORAL 2 TIMES DAILY
Status: DISCONTINUED | OUTPATIENT
Start: 2024-05-03 | End: 2024-05-06 | Stop reason: HOSPADM

## 2024-05-03 RX ORDER — ONDANSETRON 2 MG/ML
INJECTION INTRAMUSCULAR; INTRAVENOUS PRN
Status: DISCONTINUED | OUTPATIENT
Start: 2024-05-03 | End: 2024-05-03 | Stop reason: SDUPTHER

## 2024-05-03 RX ORDER — ALBUTEROL SULFATE 2.5 MG/3ML
2.5 SOLUTION RESPIRATORY (INHALATION) EVERY 6 HOURS PRN
Status: DISCONTINUED | OUTPATIENT
Start: 2024-05-03 | End: 2024-05-06 | Stop reason: HOSPADM

## 2024-05-03 RX ORDER — SODIUM CHLORIDE, SODIUM LACTATE, POTASSIUM CHLORIDE, CALCIUM CHLORIDE 600; 310; 30; 20 MG/100ML; MG/100ML; MG/100ML; MG/100ML
INJECTION, SOLUTION INTRAVENOUS CONTINUOUS
Status: DISCONTINUED | OUTPATIENT
Start: 2024-05-03 | End: 2024-05-03 | Stop reason: HOSPADM

## 2024-05-03 RX ORDER — ONDANSETRON 2 MG/ML
4 INJECTION INTRAMUSCULAR; INTRAVENOUS EVERY 6 HOURS PRN
Status: DISCONTINUED | OUTPATIENT
Start: 2024-05-03 | End: 2024-05-06 | Stop reason: HOSPADM

## 2024-05-03 RX ORDER — PROMETHAZINE HYDROCHLORIDE 12.5 MG/1
12.5 TABLET ORAL 4 TIMES DAILY PRN
Qty: 20 TABLET | Refills: 2 | Status: SHIPPED | OUTPATIENT
Start: 2024-05-03

## 2024-05-03 RX ORDER — OXYCODONE HYDROCHLORIDE 5 MG/1
5-10 TABLET ORAL
Qty: 60 TABLET | Refills: 0 | Status: SHIPPED | OUTPATIENT
Start: 2024-05-03 | End: 2024-05-04

## 2024-05-03 RX ORDER — SODIUM CHLORIDE 0.9 % (FLUSH) 0.9 %
5-40 SYRINGE (ML) INJECTION PRN
Status: DISCONTINUED | OUTPATIENT
Start: 2024-05-03 | End: 2024-05-06 | Stop reason: HOSPADM

## 2024-05-03 RX ORDER — OXYCODONE HYDROCHLORIDE 5 MG/1
10 TABLET ORAL EVERY 4 HOURS PRN
Status: DISCONTINUED | OUTPATIENT
Start: 2024-05-03 | End: 2024-05-06 | Stop reason: HOSPADM

## 2024-05-03 RX ORDER — OXYCODONE HYDROCHLORIDE 5 MG/1
5 TABLET ORAL EVERY 4 HOURS PRN
Status: DISCONTINUED | OUTPATIENT
Start: 2024-05-03 | End: 2024-05-06 | Stop reason: HOSPADM

## 2024-05-03 RX ADMIN — TRANEXAMIC ACID 1000 MG: 100 INJECTION, SOLUTION INTRAVENOUS at 08:17

## 2024-05-03 RX ADMIN — METHOCARBAMOL 750 MG: 750 TABLET ORAL at 15:18

## 2024-05-03 RX ADMIN — PHENYLEPHRINE HYDROCHLORIDE 15 MCG/MIN: 10 INJECTION INTRAVENOUS at 09:08

## 2024-05-03 RX ADMIN — PROPOFOL 75 MCG/KG/MIN: 10 INJECTION, EMULSION INTRAVENOUS at 08:29

## 2024-05-03 RX ADMIN — DEXAMETHASONE SODIUM PHOSPHATE 10 MG: 10 INJECTION INTRAMUSCULAR; INTRAVENOUS at 08:42

## 2024-05-03 RX ADMIN — HYDROMORPHONE HYDROCHLORIDE 0.5 MG: 1 INJECTION, SOLUTION INTRAMUSCULAR; INTRAVENOUS; SUBCUTANEOUS at 10:43

## 2024-05-03 RX ADMIN — OXYCODONE 5 MG: 5 TABLET ORAL at 11:53

## 2024-05-03 RX ADMIN — ACETAMINOPHEN 650 MG: 325 TABLET ORAL at 23:55

## 2024-05-03 RX ADMIN — ASPIRIN 81 MG: 81 TABLET, COATED ORAL at 19:51

## 2024-05-03 RX ADMIN — ACETAMINOPHEN 650 MG: 325 TABLET ORAL at 11:53

## 2024-05-03 RX ADMIN — SODIUM CHLORIDE, SODIUM LACTATE, POTASSIUM CHLORIDE, AND CALCIUM CHLORIDE: 600; 310; 30; 20 INJECTION, SOLUTION INTRAVENOUS at 08:45

## 2024-05-03 RX ADMIN — SENNOSIDES AND DOCUSATE SODIUM 1 TABLET: 50; 8.6 TABLET ORAL at 19:49

## 2024-05-03 RX ADMIN — TUBERCULIN PURIFIED PROTEIN DERIVATIVE 5 UNITS: 5 INJECTION, SOLUTION INTRADERMAL at 15:18

## 2024-05-03 RX ADMIN — Medication 10 MG: at 09:06

## 2024-05-03 RX ADMIN — CEFAZOLIN 2000 MG: 10 INJECTION, POWDER, FOR SOLUTION INTRAVENOUS at 15:18

## 2024-05-03 RX ADMIN — Medication 5 MG: at 08:47

## 2024-05-03 RX ADMIN — PHENYLEPHRINE HYDROCHLORIDE 100 MCG: 0.1 INJECTION, SOLUTION INTRAVENOUS at 08:56

## 2024-05-03 RX ADMIN — ONDANSETRON 4 MG: 2 INJECTION INTRAMUSCULAR; INTRAVENOUS at 10:43

## 2024-05-03 RX ADMIN — ONDANSETRON 4 MG: 2 INJECTION INTRAMUSCULAR; INTRAVENOUS at 08:42

## 2024-05-03 RX ADMIN — ACETAMINOPHEN 650 MG: 325 TABLET ORAL at 17:50

## 2024-05-03 RX ADMIN — PHENYLEPHRINE HYDROCHLORIDE 50 MCG: 0.1 INJECTION, SOLUTION INTRAVENOUS at 08:51

## 2024-05-03 RX ADMIN — ACETAMINOPHEN 1000 MG: 500 TABLET, FILM COATED ORAL at 06:49

## 2024-05-03 RX ADMIN — Medication 5 MG: at 08:44

## 2024-05-03 RX ADMIN — MIDAZOLAM 2 MG: 1 INJECTION INTRAMUSCULAR; INTRAVENOUS at 08:12

## 2024-05-03 RX ADMIN — MEPIVACAINE HYDROCHLORIDE 60 MG: 20 INJECTION, SOLUTION EPIDURAL; INFILTRATION at 08:14

## 2024-05-03 RX ADMIN — CEFAZOLIN 2000 MG: 10 INJECTION, POWDER, FOR SOLUTION INTRAVENOUS at 23:58

## 2024-05-03 RX ADMIN — TRANEXAMIC ACID 1000 MG: 100 INJECTION, SOLUTION INTRAVENOUS at 09:54

## 2024-05-03 RX ADMIN — Medication 2 G: at 08:06

## 2024-05-03 RX ADMIN — SODIUM CHLORIDE, SODIUM LACTATE, POTASSIUM CHLORIDE, AND CALCIUM CHLORIDE: 600; 310; 30; 20 INJECTION, SOLUTION INTRAVENOUS at 07:01

## 2024-05-03 RX ADMIN — SODIUM CHLORIDE, PRESERVATIVE FREE 10 ML: 5 INJECTION INTRAVENOUS at 19:51

## 2024-05-03 RX ADMIN — OXYCODONE 10 MG: 5 TABLET ORAL at 17:50

## 2024-05-03 RX ADMIN — HYDROMORPHONE HYDROCHLORIDE 0.5 MG: 1 INJECTION, SOLUTION INTRAMUSCULAR; INTRAVENOUS; SUBCUTANEOUS at 10:33

## 2024-05-03 RX ADMIN — Medication 10 MG: at 09:54

## 2024-05-03 ASSESSMENT — HOOS JR
WALKING ON UNEVEN SURFACE: MILD
HOOS JR TOTAL INTERVAL SCORE: 70.426
GOING UP OR DOWN STAIRS: MILD
RISING FROM SITTING: MILD
HOOS JR RAW SCORE: 6
SITTING: MILD
HOOS JR RAW SCORE: 6
BENDING TO THE FLOOR TO PICK UP OBJECT: MILD
LYING IN BED (TURNING OVER, MAINTAINING HIP POSITION): MILD

## 2024-05-03 ASSESSMENT — PAIN SCALES - GENERAL
PAINLEVEL_OUTOF10: 7
PAINLEVEL_OUTOF10: 6
PAINLEVEL_OUTOF10: 6
PAINLEVEL_OUTOF10: 4
PAINLEVEL_OUTOF10: 3
PAINLEVEL_OUTOF10: 7

## 2024-05-03 ASSESSMENT — PAIN DESCRIPTION - ORIENTATION
ORIENTATION: RIGHT

## 2024-05-03 ASSESSMENT — PAIN - FUNCTIONAL ASSESSMENT
PAIN_FUNCTIONAL_ASSESSMENT: PREVENTS OR INTERFERES SOME ACTIVE ACTIVITIES AND ADLS
PAIN_FUNCTIONAL_ASSESSMENT: 0-10
PAIN_FUNCTIONAL_ASSESSMENT: PREVENTS OR INTERFERES SOME ACTIVE ACTIVITIES AND ADLS

## 2024-05-03 ASSESSMENT — PAIN DESCRIPTION - LOCATION
LOCATION: HIP

## 2024-05-03 ASSESSMENT — PAIN DESCRIPTION - DESCRIPTORS
DESCRIPTORS: ACHING;DISCOMFORT
DESCRIPTORS: ACHING;STABBING
DESCRIPTORS: ACHING;DISCOMFORT
DESCRIPTORS: ACHING;DISCOMFORT;DULL

## 2024-05-03 ASSESSMENT — LIFESTYLE VARIABLES: SMOKING_STATUS: 1

## 2024-05-03 ASSESSMENT — PAIN DESCRIPTION - FREQUENCY: FREQUENCY: INTERMITTENT

## 2024-05-03 ASSESSMENT — PAIN DESCRIPTION - PAIN TYPE: TYPE: SURGICAL PAIN

## 2024-05-03 ASSESSMENT — PAIN DESCRIPTION - ONSET: ONSET: GRADUAL

## 2024-05-03 NOTE — ANESTHESIA POSTPROCEDURE EVALUATION
Department of Anesthesiology  Postprocedure Note    Patient: Eloy Rooney  MRN: [id number]  YOB: 1945  Date of evaluation: 5/3/2024    Procedure Summary       Date: 05/03/24 Room / [place]: [facility] [place] [facility] 01 / E MAIN OR    Anesthesia Start: 0805 Anesthesia Stop: 1013    Procedure: rt HIP TOTAL ARTHROPLASTY (Right: Hip) Diagnosis:       Primary osteoarthritis of right hip      (Primary osteoarthritis of right hip [M16.11])    Surgeons: Clem Shipley Jr., MD Responsible Provider: Richmond Savage MD    Anesthesia Type: spinal ASA Status: 2            Anesthesia Type: No value filed.    Estela Phase I: Estela Score: 8    Estela Phase II:      Anesthesia Post Evaluation    Patient location during evaluation: PACU  Patient participation: complete - patient participated  Level of consciousness: awake and alert  Airway patency: patent  Nausea & Vomiting: no nausea and no vomiting  Cardiovascular status: hemodynamically stable  Respiratory status: acceptable, nonlabored ventilation and spontaneous ventilation  Hydration status: euvolemic  Comments: /89   Pulse 65   Temp 98 °F (36.7 °C) (Skin)   Resp 16   Ht 1.689 m (5' 6.5\")   Wt 73.5 kg (162 lb 1.6 oz)   SpO2 95%   BMI 25.77 kg/m²     Multimodal analgesia pain management approach  Pain management: adequate and satisfactory to patient    No notable events documented.

## 2024-05-03 NOTE — CARE COORDINATION
[facility] accepted for admission on Monday 5-6    Patient s/p right HECTOR. Pt lives with his wife but has made arrangements to go to [facility] for short term rehab prior to surgery. Patient has called me several times prior to surgery to confirm these arrangements. [facility] aware but we will need final approval. Hoping for approval for admission on Monday 5-6. PPD ordered.      05/03/24 1248   Service Assessment   Patient Orientation Alert and Oriented   Cognition Alert   History Provided By Patient   Primary Caregiver Self   Support Systems Spouse/Significant Other   Patient's Healthcare Decision Maker is: Legal Next of Kin   Prior Functional Level Independent in ADLs/IADLs   Current Functional Level Assistance with the following:;Mobility;Shopping;Housework;Cooking   Can patient return to prior living arrangement No   Ability to make needs known: Good   Family able to assist with home care needs: Yes   Would you like for me to discuss the discharge plan with any other family members/significant others, and if so, who? No   Financial Resources Medicare   Services At/After Discharge   Transition of Care Consult (CM Consult) SNF   Partner SNF Yes   Services At/After Discharge Skilled Nursing Facility (SNF)   Mode of Transport at Discharge Self   Confirm Follow Up Transport Self   Condition of Participation: Discharge Planning   The Plan for Transition of Care is related to the following treatment goals: improve mobility   The Patient and/or Patient Representative was provided with a Choice of Provider? Patient   The Patient and/Or Patient Representative agree with the Discharge Plan? Yes   Freedom of Choice list was provided with basic dialogue that supports the patient's individualized plan of care/goals, treatment preferences, and shares the quality data associated with the providers?  Yes

## 2024-05-03 NOTE — ANESTHESIA PROCEDURE NOTES
Spinal Block    Patient location during procedure: OR  End time: 5/3/2024 8:21 AM  Reason for block: primary anesthetic  Staffing  Performed: anesthesiologist   Anesthesiologist: Richmond Savage MD  Performed by: Richmond Savage MD  Authorized by: Richmond Savage MD    Spinal Block  Patient position: sitting  Prep: ChloraPrep  Patient monitoring: continuous pulse ox and oxygen  Approach: midline  Location: L3/L4  Provider prep: mask and sterile gloves  Local infiltration: lidocaine  Needle  Needle type: pencil-tip   Needle gauge: 25 G  Needle length: 3.5 in  Assessment  Swirl obtained: Yes  CSF: clear  Attempts: 1  Hemodynamics: stable  Additional Notes  Uneventful spinal block  Preanesthetic Checklist  Completed: patient identified, IV checked, site marked, risks and benefits discussed, surgical/procedural consents, equipment checked, pre-op evaluation, timeout performed, anesthesia consent given, oxygen available, monitors applied/VS acknowledged, fire risk safety assessment completed and verbalized and blood product R/B/A discussed and consented

## 2024-05-03 NOTE — PERIOP NOTE
TRANSFER - OUT REPORT:    Verbal report given to AMELIA Toure on Eloy Rooney  being transferred to [facility] for routine post-op       Report consisted of patient's Situation, Background, Assessment and   Recommendations(SBAR).     Information from the following report(s) Nurse Handoff Report and Cardiac Rhythm SR  was reviewed with the receiving nurse.           Lines:   Peripheral IV 05/03/24 Left;Posterior Forearm (Active)   Site Assessment Clean, dry & intact 05/03/24 1009   Line Status Infusing 05/03/24 1009   Phlebitis Assessment No symptoms 05/03/24 1009   Infiltration Assessment 0 05/03/24 1009   Alcohol Cap Used No 05/03/24 1009   Dressing Status Clean, dry & intact 05/03/24 1009   Dressing Type Transparent 05/03/24 1009        Opportunity for questions and clarification was provided.      Patient transported with:  O2 @ room air lpm

## 2024-05-03 NOTE — ANESTHESIA PRE PROCEDURE
\"COVID19\"        Anesthesia Evaluation  Patient summary reviewed  Airway: Mallampati: II          Dental: normal exam         Pulmonary:Negative Pulmonary ROS breath sounds clear to auscultation  (+)           current smoker                           Cardiovascular:  Exercise tolerance: good (>4 METS)  (+) hyperlipidemia    (-) past MI, murmur and peripheral edema      Rhythm: regular  Rate: normal                    Neuro/Psych:   Negative Neuro/Psych ROS     (-) CVA           GI/Hepatic/Renal: Neg GI/Hepatic/Renal ROS            Endo/Other: Negative Endo/Other ROS   (+) hyperthyroidism::..                  ROS comment: Sun'aq Abdominal:             Vascular: negative vascular ROS.         Other Findings:       Anesthesia Plan      spinal     ASA 2       Induction: intravenous.      Anesthetic plan and risks discussed with patient.                    Richmond Savage MD   5/3/2024

## 2024-05-03 NOTE — INTERVAL H&P NOTE
Update History & Physical    The patient's History and Physical of April 26, 2024 was reviewed with the patient and I examined the patient. There was no change. The surgical site was confirmed by the patient and me.     Plan: The risks, benefits, expected outcome, and alternative to the recommended procedure have been discussed with the patient. Patient understands and wants to proceed with the procedure.     Electronically signed by KATELYN COFFMAN JR, MD on 5/3/2024 at 6:34 AM

## 2024-05-03 NOTE — OP NOTE
Of total Hip Procedure Note    Eloy Rooney,  [id number],  1945    Pre-operative Diagnosis:  Primary osteoarthritis of right hip [M16.11]  Post-operative Diagnosis: same    Procedure: Procedure(s) (LRB):  rt HIP TOTAL ARTHROPLASTY (Right)  CPT code: 13458  Location: [facility]  Surgeon: Clem Shipley MD  Assistant: Maria Isabel Winston  Anesthesia: Spinal    Indications: Patient has end stage arthritis. They have tried and failed conservative management.    Findings: severe degenerative arthritis, acetabular osteophytes and bone spurs around the femoral head.  EBL: 300cc  BMI: Body mass index is 25.77 kg/m².    Procedure:   The complexity of total joint surgery requires the use of a first assistant for positioning, retraction and expertise in closure.     Eloy Rooney was brought to the operating room and positioned on the operating table. Anesthesia was administered. IV antibiotics were administered. A time out identifying the patient, procedure, operative side and surgeon was administered and charted by the OR Nurse.   The patient was positioned on the contralateral decubitus position. The limb was prepped and draped in the usual sterile fashion.  An EKG lead was placed over the inferior pole of the patella and covered with a Tegaderm.  This could be palpated through the case through the stockinette and use for measuring leg lengths.    The anterior superior iliac spine was palpated.  4 cm lateral to the anterior superior iliac spine to 4.0 pins were placed through small stab wounds.  A third stab wound was made between this.  The array assembly was positioned over the pin and a third pin was inserted with the array down to bone.  The array was tightened.    A posterior approach was utilized to expose the hip. The incision was carried through the subcutaneous tissue and underlying fascia. Hemostasis obtained using the bovie cauterization. A Charnley retractor was inserted.  The checkpoint was

## 2024-05-04 LAB
MM INDURATION, POC: 0 MM (ref 0–5)
PPD, POC: NEGATIVE

## 2024-05-04 PROCEDURE — 6370000000 HC RX 637 (ALT 250 FOR IP): Performed by: PHYSICIAN ASSISTANT

## 2024-05-04 PROCEDURE — 6360000002 HC RX W HCPCS: Performed by: PHYSICIAN ASSISTANT

## 2024-05-04 PROCEDURE — 1100000000 HC RM PRIVATE

## 2024-05-04 PROCEDURE — 6370000000 HC RX 637 (ALT 250 FOR IP): Performed by: ORTHOPAEDIC SURGERY

## 2024-05-04 PROCEDURE — 2580000003 HC RX 258: Performed by: PHYSICIAN ASSISTANT

## 2024-05-04 PROCEDURE — 97530 THERAPEUTIC ACTIVITIES: CPT

## 2024-05-04 RX ORDER — OXYCODONE HYDROCHLORIDE 5 MG/1
5-10 TABLET ORAL
Qty: 60 TABLET | Refills: 0 | Status: SHIPPED | OUTPATIENT
Start: 2024-05-04 | End: 2024-05-09

## 2024-05-04 RX ORDER — CELECOXIB 200 MG/1
200 CAPSULE ORAL DAILY
Status: DISCONTINUED | OUTPATIENT
Start: 2024-05-04 | End: 2024-05-06 | Stop reason: HOSPADM

## 2024-05-04 RX ADMIN — ASPIRIN 81 MG: 81 TABLET, COATED ORAL at 21:03

## 2024-05-04 RX ADMIN — SENNOSIDES AND DOCUSATE SODIUM 1 TABLET: 50; 8.6 TABLET ORAL at 21:03

## 2024-05-04 RX ADMIN — METHOCARBAMOL 750 MG: 750 TABLET ORAL at 12:15

## 2024-05-04 RX ADMIN — ASPIRIN 81 MG: 81 TABLET, COATED ORAL at 10:26

## 2024-05-04 RX ADMIN — HYDROMORPHONE HYDROCHLORIDE 1 MG: 1 INJECTION, SOLUTION INTRAMUSCULAR; INTRAVENOUS; SUBCUTANEOUS at 23:48

## 2024-05-04 RX ADMIN — OXYCODONE 10 MG: 5 TABLET ORAL at 17:14

## 2024-05-04 RX ADMIN — HYDROMORPHONE HYDROCHLORIDE 1 MG: 1 INJECTION, SOLUTION INTRAMUSCULAR; INTRAVENOUS; SUBCUTANEOUS at 13:39

## 2024-05-04 RX ADMIN — SODIUM CHLORIDE, PRESERVATIVE FREE 10 ML: 5 INJECTION INTRAVENOUS at 21:04

## 2024-05-04 RX ADMIN — OXYCODONE 5 MG: 5 TABLET ORAL at 06:27

## 2024-05-04 RX ADMIN — SENNOSIDES AND DOCUSATE SODIUM 1 TABLET: 50; 8.6 TABLET ORAL at 10:26

## 2024-05-04 RX ADMIN — METHOCARBAMOL 750 MG: 750 TABLET ORAL at 03:47

## 2024-05-04 RX ADMIN — ACETAMINOPHEN 650 MG: 325 TABLET ORAL at 05:27

## 2024-05-04 RX ADMIN — ACETAMINOPHEN 650 MG: 325 TABLET ORAL at 17:14

## 2024-05-04 RX ADMIN — OXYCODONE 5 MG: 5 TABLET ORAL at 02:31

## 2024-05-04 RX ADMIN — ACETAMINOPHEN 650 MG: 325 TABLET ORAL at 12:15

## 2024-05-04 RX ADMIN — CELECOXIB 200 MG: 200 CAPSULE ORAL at 10:26

## 2024-05-04 RX ADMIN — OXYCODONE 10 MG: 5 TABLET ORAL at 21:03

## 2024-05-04 RX ADMIN — OXYCODONE 10 MG: 5 TABLET ORAL at 10:26

## 2024-05-04 RX ADMIN — ACETAMINOPHEN 650 MG: 325 TABLET ORAL at 23:44

## 2024-05-04 ASSESSMENT — PAIN SCALES - GENERAL
PAINLEVEL_OUTOF10: 9
PAINLEVEL_OUTOF10: 7
PAINLEVEL_OUTOF10: 5
PAINLEVEL_OUTOF10: 3
PAINLEVEL_OUTOF10: 10
PAINLEVEL_OUTOF10: 4
PAINLEVEL_OUTOF10: 7
PAINLEVEL_OUTOF10: 7
PAINLEVEL_OUTOF10: 5
PAINLEVEL_OUTOF10: 3
PAINLEVEL_OUTOF10: 3
PAINLEVEL_OUTOF10: 4
PAINLEVEL_OUTOF10: 4
PAINLEVEL_OUTOF10: 7
PAINLEVEL_OUTOF10: 6
PAINLEVEL_OUTOF10: 3

## 2024-05-04 ASSESSMENT — PAIN DESCRIPTION - DESCRIPTORS
DESCRIPTORS: TENDER
DESCRIPTORS: ACHING;DISCOMFORT
DESCRIPTORS: ACHING
DESCRIPTORS: ACHING
DESCRIPTORS: DISCOMFORT;THROBBING
DESCRIPTORS: CRAMPING
DESCRIPTORS: ACHING

## 2024-05-04 ASSESSMENT — PAIN DESCRIPTION - ORIENTATION
ORIENTATION: RIGHT

## 2024-05-04 ASSESSMENT — PAIN DESCRIPTION - LOCATION
LOCATION: HIP
LOCATION: LEG
LOCATION: HIP

## 2024-05-04 ASSESSMENT — PAIN - FUNCTIONAL ASSESSMENT
PAIN_FUNCTIONAL_ASSESSMENT: PREVENTS OR INTERFERES SOME ACTIVE ACTIVITIES AND ADLS
PAIN_FUNCTIONAL_ASSESSMENT: PREVENTS OR INTERFERES SOME ACTIVE ACTIVITIES AND ADLS
PAIN_FUNCTIONAL_ASSESSMENT: ACTIVITIES ARE NOT PREVENTED
PAIN_FUNCTIONAL_ASSESSMENT: ACTIVITIES ARE NOT PREVENTED
PAIN_FUNCTIONAL_ASSESSMENT: PREVENTS OR INTERFERES SOME ACTIVE ACTIVITIES AND ADLS

## 2024-05-04 ASSESSMENT — PAIN DESCRIPTION - ONSET: ONSET: PROGRESSIVE

## 2024-05-04 ASSESSMENT — PAIN DESCRIPTION - PAIN TYPE
TYPE: SURGICAL PAIN

## 2024-05-04 ASSESSMENT — PAIN DESCRIPTION - FREQUENCY: FREQUENCY: INTERMITTENT

## 2024-05-04 NOTE — PLAN OF CARE
Problem: Pain  Goal: Verbalizes/displays adequate comfort level or baseline comfort level  5/4/2024 0811 by Tejal Brewer RN  Outcome: Progressing  5/3/2024 2149 by Erica Cabrera RN  Outcome: Progressing     Problem: Safety - Adult  Goal: Free from fall injury  5/4/2024 0811 by Tejal Brewer RN  Outcome: Progressing  5/3/2024 2149 by Erica Cabrera RN  Outcome: Progressing     Problem: Discharge Planning  Goal: Discharge to home or other facility with appropriate resources  5/4/2024 0811 by Tejal Brewer RN  Outcome: Progressing  5/3/2024 2149 by Erica Cabrera RN  Outcome: Progressing

## 2024-05-05 LAB
MM INDURATION, POC: 0 MM (ref 0–5)
PPD, POC: NEGATIVE
SARS-COV-2 RDRP RESP QL NAA+PROBE: NOT DETECTED
SOURCE: NORMAL

## 2024-05-05 PROCEDURE — 87635 SARS-COV-2 COVID-19 AMP PRB: CPT

## 2024-05-05 PROCEDURE — 1100000000 HC RM PRIVATE

## 2024-05-05 PROCEDURE — 6360000002 HC RX W HCPCS: Performed by: PHYSICIAN ASSISTANT

## 2024-05-05 PROCEDURE — 6370000000 HC RX 637 (ALT 250 FOR IP): Performed by: ORTHOPAEDIC SURGERY

## 2024-05-05 PROCEDURE — 2580000003 HC RX 258: Performed by: PHYSICIAN ASSISTANT

## 2024-05-05 PROCEDURE — 6370000000 HC RX 637 (ALT 250 FOR IP): Performed by: PHYSICIAN ASSISTANT

## 2024-05-05 RX ADMIN — ASPIRIN 81 MG: 81 TABLET, COATED ORAL at 09:07

## 2024-05-05 RX ADMIN — CELECOXIB 200 MG: 200 CAPSULE ORAL at 09:07

## 2024-05-05 RX ADMIN — ACETAMINOPHEN 650 MG: 325 TABLET ORAL at 22:24

## 2024-05-05 RX ADMIN — ACETAMINOPHEN 650 MG: 325 TABLET ORAL at 04:59

## 2024-05-05 RX ADMIN — OXYCODONE 10 MG: 5 TABLET ORAL at 18:21

## 2024-05-05 RX ADMIN — ONDANSETRON 4 MG: 2 INJECTION INTRAMUSCULAR; INTRAVENOUS at 05:04

## 2024-05-05 RX ADMIN — SENNOSIDES AND DOCUSATE SODIUM 1 TABLET: 50; 8.6 TABLET ORAL at 20:59

## 2024-05-05 RX ADMIN — ACETAMINOPHEN 650 MG: 325 TABLET ORAL at 12:19

## 2024-05-05 RX ADMIN — OXYCODONE 10 MG: 5 TABLET ORAL at 13:11

## 2024-05-05 RX ADMIN — OXYCODONE 10 MG: 5 TABLET ORAL at 09:06

## 2024-05-05 RX ADMIN — SENNOSIDES AND DOCUSATE SODIUM 1 TABLET: 50; 8.6 TABLET ORAL at 09:07

## 2024-05-05 RX ADMIN — OXYCODONE 10 MG: 5 TABLET ORAL at 22:24

## 2024-05-05 RX ADMIN — ASPIRIN 81 MG: 81 TABLET, COATED ORAL at 20:59

## 2024-05-05 RX ADMIN — SODIUM CHLORIDE, PRESERVATIVE FREE 10 ML: 5 INJECTION INTRAVENOUS at 21:01

## 2024-05-05 RX ADMIN — OXYCODONE 10 MG: 5 TABLET ORAL at 04:59

## 2024-05-05 RX ADMIN — ACETAMINOPHEN 650 MG: 325 TABLET ORAL at 18:21

## 2024-05-05 ASSESSMENT — PAIN DESCRIPTION - LOCATION
LOCATION: HIP
LOCATION: LEG
LOCATION: HIP
LOCATION: HIP

## 2024-05-05 ASSESSMENT — PAIN DESCRIPTION - ORIENTATION
ORIENTATION: RIGHT

## 2024-05-05 ASSESSMENT — PAIN DESCRIPTION - DESCRIPTORS
DESCRIPTORS: ACHING;DISCOMFORT
DESCRIPTORS: BURNING
DESCRIPTORS: ACHING
DESCRIPTORS: THROBBING

## 2024-05-05 ASSESSMENT — PAIN - FUNCTIONAL ASSESSMENT
PAIN_FUNCTIONAL_ASSESSMENT: ACTIVITIES ARE NOT PREVENTED
PAIN_FUNCTIONAL_ASSESSMENT: PREVENTS OR INTERFERES SOME ACTIVE ACTIVITIES AND ADLS
PAIN_FUNCTIONAL_ASSESSMENT: PREVENTS OR INTERFERES SOME ACTIVE ACTIVITIES AND ADLS
PAIN_FUNCTIONAL_ASSESSMENT: ACTIVITIES ARE NOT PREVENTED
PAIN_FUNCTIONAL_ASSESSMENT: PREVENTS OR INTERFERES SOME ACTIVE ACTIVITIES AND ADLS

## 2024-05-05 ASSESSMENT — PAIN SCALES - GENERAL
PAINLEVEL_OUTOF10: 1
PAINLEVEL_OUTOF10: 5
PAINLEVEL_OUTOF10: 7
PAINLEVEL_OUTOF10: 3
PAINLEVEL_OUTOF10: 1
PAINLEVEL_OUTOF10: 10
PAINLEVEL_OUTOF10: 7
PAINLEVEL_OUTOF10: 5
PAINLEVEL_OUTOF10: 2

## 2024-05-05 NOTE — PLAN OF CARE
Problem: Pain  Goal: Verbalizes/displays adequate comfort level or baseline comfort level  5/5/2024 0721 by Tejal Brewer RN  Outcome: Progressing  5/4/2024 2158 by Gale Valles RN  Outcome: Progressing     Problem: Safety - Adult  Goal: Free from fall injury  5/5/2024 0721 by Tejal Brewer RN  Outcome: Progressing  5/4/2024 2158 by Gale Valles RN  Outcome: Progressing     Problem: Discharge Planning  Goal: Discharge to home or other facility with appropriate resources  5/5/2024 0721 by Tejal Brewer RN  Outcome: Progressing  5/4/2024 2158 by Gale Valles RN  Outcome: Progressing     Problem: Skin/Tissue Integrity - Adult  Goal: Incisions, wounds, or drain sites healing without S/S of infection  5/5/2024 0721 by Tejal Brewer RN  Outcome: Progressing  5/4/2024 2158 by Gale Valles RN  Outcome: Progressing     Problem: Musculoskeletal - Adult  Goal: Return mobility to safest level of function  5/5/2024 0721 by Tejal Brewer RN  Outcome: Progressing  5/4/2024 2158 by Gale Valles RN  Outcome: Progressing  Goal: Maintain proper alignment of affected body part  5/5/2024 0721 by Tejal Brewer RN  Outcome: Progressing  5/4/2024 2158 by Gale Valles RN  Outcome: Progressing  Goal: Return ADL status to a safe level of function  5/5/2024 0721 by Tejal Brewer RN  Outcome: Progressing  5/4/2024 2158 by Gale Valles RN  Outcome: Progressing

## 2024-05-05 NOTE — CARE COORDINATION
Transition of Care Plan:    RUR: 5% \"low risk\"  Prior Level of Functioning: Independent with ADL's  Disposition: STR  If SNF or IPR: Date FOC offered: 05/03/2024  Date FOC received: 05/03/2024  DME needed: Defer to STR  Transportation at discharge: BLS transport  IM/IMM Medicare: IM letter needed at d/c  Is patient a [place] and connected with [facility]? N/A  Caregiver Contact: Pt's spouse Nila Escobedo [phone]  Discharge Caregiver contacted prior to discharge? Per pt's request  Care Conference needed? No  Barriers to discharge: Placement    Initial note: Chart reviewed for updates. Pt is pending acceptance to [facility]. Pt is also requiring 3 midnight stay prior to going to STR. CM will follow up with [facility] tomorrow on acceptance. COVID was ordered and completed today. Pt does not need Precert. CM will continue to follow up with d/c planning.    ADDIS Loving

## 2024-05-05 NOTE — PLAN OF CARE
Problem: Pain  Goal: Verbalizes/displays adequate comfort level or baseline comfort level  5/4/2024 2158 by Gale Valles RN  Outcome: Progressing  5/4/2024 0811 by Tejal Brewer RN  Outcome: Progressing     Problem: Safety - Adult  Goal: Free from fall injury  5/4/2024 2158 by Gale Valles RN  Outcome: Progressing  5/4/2024 0811 by Tejal Brewer RN  Outcome: Progressing     Problem: Discharge Planning  Goal: Discharge to home or other facility with appropriate resources  5/4/2024 2158 by Gale Valles RN  Outcome: Progressing  5/4/2024 0811 by Tejal Brewer RN  Outcome: Progressing     Problem: Skin/Tissue Integrity - Adult  Goal: Incisions, wounds, or drain sites healing without S/S of infection  Outcome: Progressing     Problem: Musculoskeletal - Adult  Goal: Return mobility to safest level of function  Outcome: Progressing     Problem: Musculoskeletal - Adult  Goal: Maintain proper alignment of affected body part  Outcome: Progressing     Problem: Musculoskeletal - Adult  Goal: Return ADL status to a safe level of function  Outcome: Progressing

## 2024-05-06 VITALS
SYSTOLIC BLOOD PRESSURE: 142 MMHG | DIASTOLIC BLOOD PRESSURE: 86 MMHG | WEIGHT: 162.1 LBS | RESPIRATION RATE: 18 BRPM | TEMPERATURE: 98.8 F | HEART RATE: 86 BPM | OXYGEN SATURATION: 94 % | HEIGHT: 66 IN | BODY MASS INDEX: 26.05 KG/M2

## 2024-05-06 DIAGNOSIS — M16.11 PRIMARY OSTEOARTHRITIS OF RIGHT HIP: Primary | ICD-10-CM

## 2024-05-06 LAB
HCT VFR BLD AUTO: 42 % (ref 41.1–50.3)
HGB BLD-MCNC: 14.3 G/DL (ref 13.6–17.2)

## 2024-05-06 PROCEDURE — 85014 HEMATOCRIT: CPT

## 2024-05-06 PROCEDURE — 36415 COLL VENOUS BLD VENIPUNCTURE: CPT

## 2024-05-06 PROCEDURE — 6370000000 HC RX 637 (ALT 250 FOR IP): Performed by: PHYSICIAN ASSISTANT

## 2024-05-06 PROCEDURE — 85018 HEMOGLOBIN: CPT

## 2024-05-06 PROCEDURE — 6370000000 HC RX 637 (ALT 250 FOR IP): Performed by: ORTHOPAEDIC SURGERY

## 2024-05-06 RX ORDER — OXYCODONE AND ACETAMINOPHEN 7.5; 325 MG/1; MG/1
1-2 TABLET ORAL
Qty: 36 TABLET | Refills: 0 | Status: SHIPPED | OUTPATIENT
Start: 2024-05-06 | End: 2024-05-09

## 2024-05-06 RX ORDER — PROMETHAZINE HYDROCHLORIDE 25 MG/1
25 TABLET ORAL 3 TIMES DAILY PRN
Qty: 20 TABLET | Refills: 0 | Status: SHIPPED | OUTPATIENT
Start: 2024-05-06 | End: 2024-05-13

## 2024-05-06 RX ORDER — TIZANIDINE 2 MG/1
2 TABLET ORAL NIGHTLY PRN
Qty: 10 TABLET | Refills: 0 | Status: SHIPPED | OUTPATIENT
Start: 2024-05-06

## 2024-05-06 RX ORDER — ASPIRIN 81 MG/1
81 TABLET ORAL 2 TIMES DAILY
Qty: 70 TABLET | Refills: 0 | Status: SHIPPED | OUTPATIENT
Start: 2024-05-06

## 2024-05-06 RX ADMIN — ASPIRIN 81 MG: 81 TABLET, COATED ORAL at 08:22

## 2024-05-06 RX ADMIN — OXYCODONE 5 MG: 5 TABLET ORAL at 08:22

## 2024-05-06 RX ADMIN — OXYCODONE 5 MG: 5 TABLET ORAL at 03:12

## 2024-05-06 RX ADMIN — SENNOSIDES AND DOCUSATE SODIUM 1 TABLET: 50; 8.6 TABLET ORAL at 08:22

## 2024-05-06 RX ADMIN — CELECOXIB 200 MG: 200 CAPSULE ORAL at 08:22

## 2024-05-06 ASSESSMENT — PAIN DESCRIPTION - ORIENTATION
ORIENTATION: RIGHT
ORIENTATION: RIGHT

## 2024-05-06 ASSESSMENT — PAIN SCALES - GENERAL
PAINLEVEL_OUTOF10: 5
PAINLEVEL_OUTOF10: 3
PAINLEVEL_OUTOF10: 5

## 2024-05-06 ASSESSMENT — PAIN DESCRIPTION - LOCATION
LOCATION: HIP
LOCATION: HIP

## 2024-05-06 ASSESSMENT — PAIN - FUNCTIONAL ASSESSMENT: PAIN_FUNCTIONAL_ASSESSMENT: ACTIVITIES ARE NOT PREVENTED

## 2024-05-06 NOTE — CARE COORDINATION
Initial note:    Chart reviewed for updates. Pt has a discharge order. Boubacar from [facility] confirmed that pt has a bed available today. Bed assignment 310 and number for report is [phone]. [facility] transport scheduled for 1pm. CM met with pt at bedside, introduced role and discussed d/c planning. Pt is agreeable with d/c planning. He was notified of bed assignment. He reports that his friend is unable to provide transport hence agreeable with [facility] transport. CM explained that he will likely have a co-pay. Pt is agreeable. IM letter given; signed copy placed on medical chart. Wife is at [facility] signing forms. Assigned RN given updates and informed to call for report. Discharge forms and PCS copy completed. No further CM needs identified. CM will remain accessible for consult incase additional CM needs arise prior d/c.     05/06/24 1008   Services At/After Discharge   Transition of Care Consult (CM Consult) SNF   Services At/After Discharge Skilled Nursing Facility (SNF)   [place] Resource Information Provided? No   Mode of Transport at Discharge S   Hospital Transport Time of Discharge 1300   Confirm Follow Up Transport   ([facility] transport scheduled)   Condition of Participation: Discharge Planning   The Plan for Transition of Care is related to the following treatment goals: Pt is discharging to [facility]   The Patient and/Or Patient Representative agree with the Discharge Plan? Yes   Freedom of Choice list was provided with basic dialogue that supports the patient's individualized plan of care/goals, treatment preferences, and shares the quality data associated with the providers?  Yes     ADDIS Loving

## 2024-05-06 NOTE — PROGRESS NOTES
Name: Eloy Rooney  YOB: 1945  Gender: male  MRN: [id number]           May 4, 2024         Post Op day: 1 Day Post-Op     Admit Date: 5/3/2024  Admit Diagnosis: Primary osteoarthritis of right hip [M16.11]  Status post hip replacement, right [Z96.641]  Procedure: Procedure(s) (LRB):  rt HIP TOTAL ARTHROPLASTY (Right)     LAB:    No results found for this or any previous visit (from the past 24 hour(s)).  Vital Signs:    Patient Vitals for the past 8 hrs:   BP Temp Temp src Pulse Resp SpO2   24 0347 (!) 151/74 98 °F (36.7 °C) Oral 76 18 96 %   24 0301 -- -- -- -- 16 --   24 0015 113/65 97.7 °F (36.5 °C) -- 72 18 95 %     Temp (24hrs), Av.9 °F (36.6 °C), Min:97.5 °F (36.4 °C), Max:98.1 °F (36.7 °C)    Body mass index is 25.77 kg/m².  Pain Control:        Subjective: Awake and alert     Objective: Vital Signs are Stable, No Acute Distress, Alert and Oriented, Dressing is Dry, Calves soft, Neurovascular exam is normal.        PT/OT:     Ambulation/Gait (if applicable):   Ambulation  Distance: additional 150ft after an initial 50ft gait assessment      Weight Bearing Status: WBAT    Meds:  sodium chloride flush, 5-40 mL, 2 times per day  acetaminophen, 650 mg, Q6H  sennosides-docusate sodium, 1 tablet, BID  aspirin, 81 mg, BID  tuberculin, 5 Units, Once      sodium chloride      albuterol, 2.5 mg, Q6H PRN  sodium chloride flush, 5-40 mL, PRN  sodium chloride, , PRN  oxyCODONE, 5 mg, Q4H PRN   Or  oxyCODONE, 10 mg, Q4H PRN  HYDROmorphone, 1 mg, Q3H PRN  methocarbamol, 750 mg, Q8H PRN  ondansetron, 4 mg, Q8H PRN   Or  ondansetron, 4 mg, Q6H PRN  diphenhydrAMINE, 25 mg, Q6H PRN   Or  diphenhydrAMINE, 25 mg, Q6H PRN        Assessment:    Patient Active Problem List   Diagnosis    Abnormal liver enzymes    Elevated blood pressure reading without diagnosis of hypertension    Graves' disease    Hyperglycemia    Hyperlipidemia    Hyperthyroidism    Primary osteoarthritis of right hip    
   05/03/24 2014   Oxygen Therapy/Pulse Ox   O2 Device None (Room air)   Pulse 85   Respirations 18   SpO2 95 %   Pulse Oximeter Device Mode Intermittent   Pulse Oximeter Device Location Left;Finger   $Pulse Oximeter $Spot check (single)     Pt sitting up in chair. NAD. Pt declined Continuous sat monitor tonight. Stated he would not get any sleep. RN notified.  
4 Eyes Skin Assessment     NAME:  Eloy Rooney  YOB: 1945  MEDICAL RECORD NUMBER:  [id number]    The patient is being assessed for  Post-Op Surgical    I agree that at least one RN has performed a thorough Head to Toe Skin Assessment on the patient. ALL assessment sites listed below have been assessed.      Areas assessed by both nurses:    Head, Face, Ears, Shoulders, Back, Chest, Arms, Elbows, Hands, Sacrum. Buttock, Coccyx, Ischium, Legs. Feet and Heels, Under Medical Devices , and Other ***        Does the Patient have a Wound? No noted wound(s)       Tez Prevention initiated by RN: Yes  Wound Care Orders initiated by RN: No    Pressure Injury (Stage 3,4, Unstageable, DTI, NWPT, and Complex wounds) if present, place Wound referral order by RN under : No    New Ostomies, if present place, Ostomy referral order under : No     Nurse 1 eSignature: Electronically signed by Tejal Brewer RN on 5/3/24 at 12:47 PM EDT    **SHARE this note so that the co-signing nurse can place an eSignature**    Nurse 2 eSignature: {Esignature:[id number]}   
ACUTE PHYSICAL THERAPY GOALS:   (Developed with and agreed upon by patient and/or caregiver.)  GOALS (1-4 days):  (1.)Mr. Rooney will move from supine to sit and sit to supine  in bed with INDEPENDENT.    (2.)Mr. Rooney will transfer from bed to chair and chair to bed with SUPERVISION using the least restrictive device.    (3.)Mr. Rooney will ambulate with SUPERVISION for 300 feet with the least restrictive device.   (4.)Mr. Rooney will ambulate up/down 8 steps with bilateral  railing with STAND BY ASSIST.  (5.)Mr. Rooney will state/observe HECTOR precautions with no verbal cues.  ________________________________________________________________________________________________     PHYSICAL THERAPY: TOTAL HIP ARTHROPLASTY Daily Note and AM  (Link to Caseload Tracking: PT Visit Days : 2  Acknowledge Orders  Time In/Out  PT Charge Capture  Rehab Caseload Tracker  Episode   Eloy Rooney is a 78 y.o. male   PRIMARY DIAGNOSIS: Status post right hip replacement  Primary osteoarthritis of right hip [M16.11]  Status post hip replacement, right [Z96.641]  Procedure(s) (LRB):  rt HIP TOTAL ARTHROPLASTY (Right)  1 Day Post-Op  Reason for Referral: Pain in Right Hip (M25.551)  Stiffness of Right Hip, Not elsewhere classified (M25.651)  Difficulty in walking, Not elsewhere classified (R26.2)  Inpatient: Payor: MEDICARE / Plan: MEDICARE PART A AND B / Product Type: *No Product type* /     REHAB RECOMMENDATIONS:   Recommendation to date pending progress:  Setting:  Home Health Therapy  (Pt insisted on SNF rehab)    Equipment:    Rolling Walker     GAIT: I Mod I S SBA CGA Min Mod Max Total  NT x2 Comments:   Level of Assistance [] [] [] [x] [] [] [] [] [] [] []    Weightbearing Status  Right Lower Extremity Weight Bearing: Weight Bearing As Tolerated    Distance  300 feet    Gait Quality Antalgic, Decreased reva , Decreased step clearance, Decreased step length, and Decreased stance    DME Rolling Walker     Stairs NT     Ramp 
ACUTE PHYSICAL THERAPY GOALS:   (Developed with and agreed upon by patient and/or caregiver.)  GOALS (1-4 days):  (1.)Mr. Rooney will move from supine to sit and sit to supine  in bed with INDEPENDENT.    (2.)Mr. Rooney will transfer from bed to chair and chair to bed with SUPERVISION using the least restrictive device.    (3.)Mr. Rooney will ambulate with SUPERVISION for 300 feet with the least restrictive device.   (4.)Mr. Rooney will ambulate up/down 8 steps with bilateral  railing with STAND BY ASSIST.  (5.)Mr. Rooney will state/observe HECTOR precautions with no verbal cues.  ________________________________________________________________________________________________     PHYSICAL THERAPY: TOTAL HIP ARTHROPLASTY Daily Note and AM  (Link to Caseload Tracking: PT Visit Days : 2  Acknowledge Orders  Time In/Out  PT Charge Capture  Rehab Caseload Tracker  Episode   Eloy Rooney is a 78 y.o. male   PRIMARY DIAGNOSIS: Status post right hip replacement  Primary osteoarthritis of right hip [M16.11]  Status post hip replacement, right [Z96.641]  Procedure(s) (LRB):  rt HIP TOTAL ARTHROPLASTY (Right)  1 Day Post-Op  Reason for Referral: Pain in Right Hip (M25.551)  Stiffness of Right Hip, Not elsewhere classified (M25.651)  Difficulty in walking, Not elsewhere classified (R26.2)  Inpatient: Payor: MEDICARE / Plan: MEDICARE PART A AND B / Product Type: *No Product type* /     REHAB RECOMMENDATIONS:   Recommendation to date pending progress:  Setting:  Home Health Therapy  (Pt insisted on SNF rehab)    Equipment:    Rolling Walker     GAIT: I Mod I S SBA CGA Min Mod Max Total  NT x2 Comments:   Level of Assistance [] [] [] [x] [] [] [] [] [] [] []    Weightbearing Status  Right Lower Extremity Weight Bearing: Weight Bearing As Tolerated    Distance  400 feet    Gait Quality Antalgic, Decreased reva , Decreased step clearance, Decreased step length, and Decreased stance    DME Rolling Walker     Stairs NT     Ramp 
ACUTE PHYSICAL THERAPY GOALS:   (Developed with and agreed upon by patient and/or caregiver.)  GOALS (1-4 days):  (1.)Mr. Rooney will move from supine to sit and sit to supine  in bed with INDEPENDENT.    (2.)Mr. Rooney will transfer from bed to chair and chair to bed with SUPERVISION using the least restrictive device.    (3.)Mr. Rooney will ambulate with SUPERVISION for 300 feet with the least restrictive device.   (4.)Mr. Rooney will ambulate up/down 8 steps with bilateral  railing with STAND BY ASSIST.  (5.)Mr. Rooney will state/observe HECTOR precautions with no verbal cues.  ________________________________________________________________________________________________     PHYSICAL THERAPY: TOTAL HIP ARTHROPLASTY Initial Assessment and PM  (Link to Caseload Tracking: PT Visit Days : 1  Acknowledge Orders  Time In/Out  PT Charge Capture  Rehab Caseload Tracker  Episode   Eloy Rooney is a 78 y.o. male   PRIMARY DIAGNOSIS: Status post right hip replacement  Primary osteoarthritis of right hip [M16.11]  Status post hip replacement, right [Z96.641]  Procedure(s) (LRB):  rt HIP TOTAL ARTHROPLASTY (Right)  Day of Surgery  Reason for Referral: Pain in Right Hip (M25.551)  Stiffness of Right Hip, Not elsewhere classified (M25.651)  Difficulty in walking, Not elsewhere classified (R26.2)  Inpatient: Payor: MEDICARE / Plan: MEDICARE PART A AND B / Product Type: *No Product type* /     REHAB RECOMMENDATIONS:   Recommendation to date pending progress:  Setting:  Home Health Therapy  (Pt insisted on SNF rehab)    Equipment:    Rolling Walker     GAIT: I Mod I S SBA CGA Min Mod Max Total  NT x2 Comments:   Level of Assistance [] [] [] [x] [] [] [] [] [] [] []    Weightbearing Status  Right Lower Extremity Weight Bearing: Weight Bearing As Tolerated    Distance  additional 150ft after an initial 50ft gait assessment     Gait Quality Antalgic, Decreased reva , Decreased step clearance, Decreased step length, and 
Occupational Therapy Note:    Attempted to see patient this AM for occupational therapy treatment  session. Patient declined to participate today, reports he is \"not stable enough\" to shower yet was able to ambulate 300ft with physical therapy earlier this morning. Will follow and re-attempt as schedule permits/patient available. Thank you,    Arti Cox, OT    Rehab Caseload Tracker       
Orthopedic Joint Progress Note    May 6, 2024  Admit Date: 5/3/2024  Admit Diagnosis: Primary osteoarthritis of right hip [M16.11]  Status post hip replacement, right [Z96.641]    3 Days Post-Op    Subjective:     Eloy Rooney  doing well. Pain well-controlled. No acute events     Review of Systems: Musculoskeletal and general review of systems are unchanged    Objective:     PT/OT:     PATIENT MOBILITY                           Vital Signs:    Blood pressure (!) 142/86, pulse 86, temperature 98.8 °F (37.1 °C), temperature source Oral, resp. rate 18, height 1.689 m (5' 6.5\"), weight 73.5 kg (162 lb 1.6 oz), SpO2 94 %.  Temp (24hrs), Av.8 °F (37.1 °C), Min:98.8 °F (37.1 °C), Max:98.8 °F (37.1 °C)      Pain Control:        Meds:  Current Facility-Administered Medications   Medication Dose Route Frequency    celecoxib (CELEBREX) capsule 200 mg  200 mg Oral Daily    albuterol (PROVENTIL) (2.5 MG/3ML) 0.083% nebulizer solution 2.5 mg  2.5 mg Nebulization Q6H PRN    sodium chloride flush 0.9 % injection 5-40 mL  5-40 mL IntraVENous 2 times per day    sodium chloride flush 0.9 % injection 5-40 mL  5-40 mL IntraVENous PRN    0.9 % sodium chloride infusion   IntraVENous PRN    acetaminophen (TYLENOL) tablet 650 mg  650 mg Oral Q6H    oxyCODONE (ROXICODONE) immediate release tablet 5 mg  5 mg Oral Q4H PRN    Or    oxyCODONE (ROXICODONE) immediate release tablet 10 mg  10 mg Oral Q4H PRN    HYDROmorphone HCl PF (DILAUDID) injection 1 mg  1 mg IntraVENous Q3H PRN    sennosides-docusate sodium (SENOKOT-S) 8.6-50 MG tablet 1 tablet  1 tablet Oral BID    ondansetron (ZOFRAN-ODT) disintegrating tablet 4 mg  4 mg Oral Q8H PRN    Or    ondansetron (ZOFRAN) injection 4 mg  4 mg IntraVENous Q6H PRN    diphenhydrAMINE (BENADRYL) capsule 25 mg  25 mg Oral Q6H PRN    Or    diphenhydrAMINE (BENADRYL) injection 25 mg  25 mg IntraVENous Q6H PRN    aspirin EC tablet 81 mg  81 mg Oral BID        LAB:    Lab Results   Component Value 
PT followed up at the end of the day per pt request after refusing this am session, stating that he was not feeling up to it. This pm pt stated \" I am not doing anything because it is not convenient & I do not feel up to it .\" Pt was in his recliner only watching TV when PT arrived.   Pt passionately refused both am & pm sessions despite efforts to persuade pt to participate. Pt began to become verbally abusive & threatening PT Not inform Dr Shipley that he refused therapy today, as therapist was exiting the room.   Mika Daigle, PT, DASHA  
Pt received to room. Pt alert and oriented. Pt oriented to room and bed controls. Spouse at bedside.   
Pt sitting in the chair upon arrival.  Therapist ask pt could we go over your exercises and take walk.  Pt said  and I talk  and this how the conversation went:  Pt said I told him I went 400 ft and the doctor thought that was to much.  Pt said they talk about the R hip exercises on  how  much to do and not do.. He said Dr. Shipley is going to keep him at the [facility] for 10-14 days, because he knows how my living environment is. Pt is up in the room independently and states he does his exercises without the night.  I have only slept 4 hrs.  I hope I can get out of here today by 11:00.  I am ready to go.  My wife is checking me in.    
TRANSFER - IN REPORT:    Verbal report received from AMELIA Rodriguez on Eloy Rooney  being received from PACU for routine post-op      Report consisted of patient's Situation, Background, Assessment and   Recommendations(SBAR).     Information from the following report(s) Nurse Handoff Report was reviewed with the receiving nurse.    Opportunity for questions and clarification was provided.      Assessment completed upon patient's arrival to unit and care assumed.    
TRANSFER - OUT REPORT:    Verbal report given to AMELIA Smalls on Eloy Rooney  being transferred to [facility] for routine progression of patient care       Report consisted of patient's Situation, Background, Assessment and   Recommendations(SBAR).     Information from the following report(s) Nurse Handoff Report was reviewed with the receiving nurse.           Lines:   Peripheral IV 05/03/24 Left;Posterior Forearm (Active)   Site Assessment Clean, dry & intact 05/06/24 0720   Line Status Flushed;Capped 05/06/24 0720   Line Care Connections checked and tightened 05/06/24 0720   Phlebitis Assessment No symptoms 05/06/24 0720   Infiltration Assessment 0 05/06/24 0720   Alcohol Cap Used Yes 05/06/24 0720   Dressing Status Clean, dry & intact 05/06/24 0720   Dressing Type Transparent 05/06/24 0720        Opportunity for questions and clarification was provided.             
replacement, right             Plan: Continue PT/OT. Home soon.       Signed By: FABRICE RENEE MD        
self-care/home management as related to dressing and functional mobility  . Required minimal visual, verbal, and tactile cueing to facilitate activities of daily living skills, compensatory activities, and OT poc, discharge plan, hip precautions, application of hip precautions to ADL tasks. Walker use and resting joint position.  .'      AFTER TREATMENT PRECAUTIONS: Bed/Chair Locked, Call light within reach, Chair, Needs within reach, RN notified, Visitors at bedside, and CNA notified and to place alarm 1600 tab alert in place    INTERDISCIPLINARY COLLABORATION:  RN/ PCT, PT/ PTA, and OT/ ALLEN    Interdisciplinary Patient Education  (Reference education tab)    [x] Safe And Effective Hygiene  [x] Fall Precautions  [x] Hip Precautions  [] D/C Instruction Review [x] Self Care Training and Home Safety  [x] Walker Management/Safety  [x] Adaptive Equipment as Needed  [x] Therapeutic Resting Position of Joint     TOTAL TREATMENT DURATION AND TIME:  Time In: 1435  Time Out: 1510  Minutes: 35  Time in 1550  Time out 1600  Minutes 35  Brisa Fontenot, OT               
dietitian/nutrition services

## 2024-05-06 NOTE — DISCHARGE SUMMARY
[facility]   Discharge Summary         Patient ID:  Eloy Rooney  [id number]  78 y.o.  1945    Admit date: 5/3/2024  Discharge date and time:    Admitting Physician: Clem Shipley Jr., MD  Surgeon: Same    Hospital Course    Admission Diagnoses: Pre op diagnosis: Primary osteoarthritis of right hip [M16.11]  Prior to surgery the patient was seen for consultation in the office or hospital and a complete history and physical was taken as it pertained to their condition.   Discharge Diagnoses: Status post right hip replacement. Chronic and Acute medical problems addressed during this hospital stay by consulting physicians include:   They underwent Procedure(s) (LRB):  rt HIP TOTAL ARTHROPLASTY (Right) for this.       Principle Problem: Status post right hip replacement.     Other Chronic and Acute Medical Issues: managed by the hospitalist during admission included: Principal Problem:    Status post right hip replacement  Active Problems:    Primary osteoarthritis of right hip    Status post hip replacement, right  Resolved Problems:    * No resolved hospital problems. *                               Perioperative Antibiotics:  Prior to surgery Ancef 1 to 2 mg was given depending on patient's weight and allergies.  If the patient was allergic to Ancef or MRSA positive  the patient was given Vancomycin and [facility] Medications:   Current Facility-Administered Medications   Medication Dose Route Frequency    celecoxib (CELEBREX) capsule 200 mg  200 mg Oral Daily    albuterol (PROVENTIL) (2.5 MG/3ML) 0.083% nebulizer solution 2.5 mg  2.5 mg Nebulization Q6H PRN    sodium chloride flush 0.9 % injection 5-40 mL  5-40 mL IntraVENous 2 times per day    sodium chloride flush 0.9 % injection 5-40 mL  5-40 mL IntraVENous PRN    0.9 % sodium chloride infusion   IntraVENous PRN    acetaminophen (TYLENOL) tablet 650 mg  650 mg Oral Q6H    oxyCODONE (ROXICODONE) immediate release tablet 5 mg

## 2024-05-07 ENCOUNTER — TELEPHONE (OUTPATIENT)
Dept: ORTHOPEDIC SURGERY | Age: 79
End: 2024-05-07
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
## 2024-05-08 NOTE — TELEPHONE ENCOUNTER
Spoke with daughter and let her address her concerns and that I would speak with Kit the  to call her to speak with her about the answering service. She was very upset with the answering service. Explained again would speak with kit . That  Dr Shipley team had nothing to do with the answering service. She said she is not upset with Dr Shipley and his team she was upset with the answering service and the hospital.

## 2024-05-13 ENCOUNTER — TELEPHONE (OUTPATIENT)
Dept: ORTHOPEDIC SURGERY | Age: 79
End: 2024-05-13

## 2024-05-13 NOTE — TELEPHONE ENCOUNTER
Patient's wife, Nila, is calling to find out if the patient is scheduled for a post op check on 5/14/24 and asks for a return call please.

## 2024-05-14 ENCOUNTER — TELEPHONE (OUTPATIENT)
Dept: ORTHOPEDIC SURGERY | Age: 79
End: 2024-05-14

## 2024-05-14 NOTE — TELEPHONE ENCOUNTER
Patient is being discharged from the [facility] on Thursday and needs to talk with someone about who you would recommend for continued in home health. Please call him on his cell [phone]

## 2024-05-15 ENCOUNTER — TELEPHONE (OUTPATIENT)
Dept: ORTHOPEDIC SURGERY | Age: 79
End: 2024-05-15

## 2024-05-15 ENCOUNTER — TELEPHONE (OUTPATIENT)
Dept: ORTHOPEDICS UNIT | Age: 79
End: 2024-05-15

## 2024-05-15 ENCOUNTER — HOME HEALTH ADMISSION (OUTPATIENT)
Dept: HOME HEALTH SERVICES | Facility: HOME HEALTH | Age: 79
End: 2024-05-15
Payer: MEDICARE

## 2024-05-15 DIAGNOSIS — Z96.641 S/P TOTAL RIGHT HIP ARTHROPLASTY: Primary | ICD-10-CM

## 2024-05-15 NOTE — TELEPHONE ENCOUNTER
His wife Nila is requesting a phone call from someone to r/s his postop sooner. Please call her at [phone].

## 2024-05-15 NOTE — TELEPHONE ENCOUNTER
Spoke with patient.  Will be discharged from [facility] tomorrow.  Is requesting [facility].  Order placed for [facility].  Patient happy with plan.  Reports that SW from [facility] will be ordering any equipment needs for home.

## 2024-05-15 NOTE — TELEPHONE ENCOUNTER
Spoke with the pt wife & confirmed the aqua seal bandage has not been changed yet & he's post-op 12 days - is being discharged from home health tomorrow morning so I spoke with mihir with home health & instructed to change aqua seal bandage & look at incision if it looks ready the staples can come out if it does not will come back a few days after bandage change. Pt wife is aware & all questions were answered regarding bandage change & [facility]

## 2024-05-16 ENCOUNTER — TELEPHONE (OUTPATIENT)
Dept: ORTHOPEDIC SURGERY | Age: 79
End: 2024-05-16

## 2024-05-16 ENCOUNTER — HOME CARE VISIT (OUTPATIENT)
Dept: SCHEDULING | Facility: HOME HEALTH | Age: 79
End: 2024-05-16

## 2024-05-16 VITALS
DIASTOLIC BLOOD PRESSURE: 76 MMHG | OXYGEN SATURATION: 97 % | SYSTOLIC BLOOD PRESSURE: 112 MMHG | HEART RATE: 72 BPM | RESPIRATION RATE: 16 BRPM | TEMPERATURE: 98 F

## 2024-05-16 PROCEDURE — G0151 HHCP-SERV OF PT,EA 15 MIN: HCPCS

## 2024-05-16 PROCEDURE — 0221000100 HH NO PAY CLAIM PROCEDURE

## 2024-05-16 ASSESSMENT — ENCOUNTER SYMPTOMS
PAIN LOCATION - PAIN QUALITY: ACHE
DYSPNEA ACTIVITY LEVEL: AFTER AMBULATING MORE THAN 20 FT

## 2024-05-16 NOTE — TELEPHONE ENCOUNTER
Left detailed VM that the picture look normal just steri strip & let us know if anymore questions

## 2024-05-16 NOTE — TELEPHONE ENCOUNTER
Fidencio is calling to report he saw him today and completed staple removal per his request. There was a small red area at the proximal end and he uploaded a photo.

## 2024-05-20 ENCOUNTER — HOME CARE VISIT (OUTPATIENT)
Dept: SCHEDULING | Facility: HOME HEALTH | Age: 79
End: 2024-05-20

## 2024-05-20 VITALS
SYSTOLIC BLOOD PRESSURE: 136 MMHG | DIASTOLIC BLOOD PRESSURE: 78 MMHG | TEMPERATURE: 98.2 F | OXYGEN SATURATION: 96 % | RESPIRATION RATE: 14 BRPM | HEART RATE: 80 BPM

## 2024-05-20 PROCEDURE — G0157 HHC PT ASSISTANT EA 15: HCPCS

## 2024-05-20 ASSESSMENT — ENCOUNTER SYMPTOMS: PAIN LOCATION - PAIN QUALITY: SORENESS

## 2024-05-23 ENCOUNTER — HOME CARE VISIT (OUTPATIENT)
Dept: SCHEDULING | Facility: HOME HEALTH | Age: 79
End: 2024-05-23

## 2024-05-23 VITALS
SYSTOLIC BLOOD PRESSURE: 132 MMHG | HEART RATE: 60 BPM | OXYGEN SATURATION: 98 % | DIASTOLIC BLOOD PRESSURE: 64 MMHG | TEMPERATURE: 97.3 F | RESPIRATION RATE: 17 BRPM

## 2024-05-23 PROCEDURE — G0157 HHC PT ASSISTANT EA 15: HCPCS

## 2024-05-23 ASSESSMENT — ENCOUNTER SYMPTOMS: PAIN LOCATION - PAIN QUALITY: ELECTRICAL SHOCK

## 2024-05-29 ENCOUNTER — HOME CARE VISIT (OUTPATIENT)
Dept: SCHEDULING | Facility: HOME HEALTH | Age: 79
End: 2024-05-29
Payer: MEDICARE

## 2024-05-29 VITALS
HEART RATE: 80 BPM | RESPIRATION RATE: 17 BRPM | OXYGEN SATURATION: 98 % | SYSTOLIC BLOOD PRESSURE: 122 MMHG | DIASTOLIC BLOOD PRESSURE: 68 MMHG | TEMPERATURE: 97.7 F

## 2024-05-29 PROCEDURE — G0157 HHC PT ASSISTANT EA 15: HCPCS

## 2024-05-31 ENCOUNTER — TELEPHONE (OUTPATIENT)
Dept: ORTHOPEDIC SURGERY | Age: 79
End: 2024-05-31

## 2024-05-31 ENCOUNTER — HOME CARE VISIT (OUTPATIENT)
Dept: SCHEDULING | Facility: HOME HEALTH | Age: 79
End: 2024-05-31
Payer: MEDICARE

## 2024-05-31 VITALS
SYSTOLIC BLOOD PRESSURE: 122 MMHG | TEMPERATURE: 97 F | RESPIRATION RATE: 17 BRPM | DIASTOLIC BLOOD PRESSURE: 68 MMHG | HEART RATE: 62 BPM | OXYGEN SATURATION: 98 %

## 2024-05-31 PROCEDURE — G0157 HHC PT ASSISTANT EA 15: HCPCS

## 2024-05-31 NOTE — TELEPHONE ENCOUNTER
He had a HECTOR on May 3 and has a postop next week. He is wondering if he can D/C his Celebrex. Please call to let him know.

## 2024-05-31 NOTE — TELEPHONE ENCOUNTER
Told patient that he can discontinue his celebrex post santos due to digestive changes. Patient is doing very well and has a postop appt scheduled for this Thursday.

## 2024-06-04 ENCOUNTER — HOME CARE VISIT (OUTPATIENT)
Dept: SCHEDULING | Facility: HOME HEALTH | Age: 79
End: 2024-06-04
Payer: MEDICARE

## 2024-06-04 VITALS
OXYGEN SATURATION: 96 % | SYSTOLIC BLOOD PRESSURE: 134 MMHG | DIASTOLIC BLOOD PRESSURE: 66 MMHG | HEART RATE: 70 BPM | TEMPERATURE: 98.2 F | RESPIRATION RATE: 14 BRPM

## 2024-06-04 PROCEDURE — G0151 HHCP-SERV OF PT,EA 15 MIN: HCPCS

## 2024-06-04 ASSESSMENT — ENCOUNTER SYMPTOMS: PAIN LOCATION - PAIN QUALITY: ACHES, SORE

## 2024-06-06 ENCOUNTER — OFFICE VISIT (OUTPATIENT)
Dept: ORTHOPEDIC SURGERY | Age: 79
End: 2024-06-06

## 2024-06-06 DIAGNOSIS — Z96.641 STATUS POST RIGHT HIP REPLACEMENT: Primary | ICD-10-CM

## 2024-06-06 NOTE — PROGRESS NOTES
Post-op hip visit    06/06/24     Allergies:  Allergies   Allergen Reactions    Epinephrine Other (See Comments)     Passed out     Heparin Other (See Comments)     Avoids heparin/ family history of uncle who had adverse reaction to Heparin         Medications:  Current Outpatient Medications on File Prior to Visit   Medication Sig Dispense Refill    melatonin 5 MG TABS tablet Take 5 mg by mouth nightly as needed (sleep).      Sennosides-Docusate Sodium (SENNA PLUS) 8.6-50 MG CAPS Take 2 tablets by mouth nightly.      aspirin 81 MG EC tablet Take 1 tablet by mouth 2 times daily 70 tablet 0    tiZANidine (ZANAFLEX) 2 MG tablet Take 1 tablet by mouth nightly as needed (pain and muscle spasm) 10 tablet 0    acetaminophen (TYLENOL) 500 MG tablet Take 1-2 tablets by mouth every 6 hours as needed for Pain      aspirin (ASPIRIN 81) 81 MG EC tablet Take 1 tablet by mouth in the morning and at bedtime 70 tablet 0    promethazine (PHENERGAN) 12.5 MG tablet Take 1 tablet by mouth 4 times daily as needed for Nausea 20 tablet 2    tiZANidine (ZANAFLEX) 2 MG tablet Take 1 tablet by mouth 3 times daily as needed (muscle spasm) 30 tablet 0    celecoxib (CELEBREX) 200 MG capsule Take 1 capsule by mouth 2 times daily 60 capsule 3    methIMAzole (TAPAZOLE) 5 MG tablet Take 1 tablet by mouth Five times weekly Takes five times per week;       No current facility-administered medications on file prior to visit.        5 weeks Status Post right HECTOR    History: The patient returns today for post-op evaluation following HECTOR. They have minimal complaints of pain. They are ambulating with a cane as a  walking aid. They have completed home therapy.   They are pleased with their results thus far. Denies any new complaints today.     Physical Exam right Hip: The patient's incision is well healed. There is minimal swelling and minimal increased warmth around the incision. The leg lengths are equal. There is minimal pain with ROM. Calves are soft

## 2024-06-07 ENCOUNTER — TELEPHONE (OUTPATIENT)
Dept: ORTHOPEDIC SURGERY | Age: 79
End: 2024-06-07

## 2024-06-07 DIAGNOSIS — Z96.641 STATUS POST RIGHT HIP REPLACEMENT: Primary | ICD-10-CM

## 2024-06-07 NOTE — TELEPHONE ENCOUNTER
Spoke with patient and let him know dropped order for [facility] also will mail copy of xrays to him and that he can drive as long as he is not taking narcotics

## 2024-06-07 NOTE — TELEPHONE ENCOUNTER
Write order  for  PT  at [facility] ---  has  order  gone out?   Remind  records  he  wants  a copy  of the  post sx  x ray  Is he  cleared to  drive?    Please  call his home # [phone]     Thank you

## 2024-06-11 ENCOUNTER — HOSPITAL ENCOUNTER (OUTPATIENT)
Dept: PHYSICAL THERAPY | Age: 79
Setting detail: RECURRING SERIES
Discharge: HOME | End: 2024-06-14
Attending: ORTHOPAEDIC SURGERY
Payer: MEDICARE

## 2024-06-11 DIAGNOSIS — M62.81 MUSCLE WEAKNESS (GENERALIZED): ICD-10-CM

## 2024-06-11 DIAGNOSIS — M25.551 HIP PAIN, RIGHT: ICD-10-CM

## 2024-06-11 DIAGNOSIS — M25.651 STIFFNESS OF HIP JOINT, RIGHT: ICD-10-CM

## 2024-06-11 DIAGNOSIS — M54.59 OTHER LOW BACK PAIN: Primary | ICD-10-CM

## 2024-06-11 DIAGNOSIS — R26.89 OTHER ABNORMALITIES OF GAIT AND MOBILITY: ICD-10-CM

## 2024-06-11 PROCEDURE — 97162 PT EVAL MOD COMPLEX 30 MIN: CPT

## 2024-06-11 PROCEDURE — 97140 MANUAL THERAPY 1/> REGIONS: CPT

## 2024-06-11 PROCEDURE — 97110 THERAPEUTIC EXERCISES: CPT

## 2024-06-11 ASSESSMENT — PAIN SCALES - GENERAL: PAINLEVEL_OUTOF10: 4

## 2024-06-11 NOTE — THERAPY EVALUATION
Frequency: 2 times/week      Interventions Planned (Treatment may consist of any combination of the following):    Balance Training, Endurance Training, Functional Mobility Training, Gait Training, Home Exercise Program (HEP), Manual Therapy, Pain Management, Range of Motion (ROM), Therapeutic Exercise/Strengthening, and Aquatic Therapy   Goals: (Goals have been discussed and agreed upon with patient.)  Short-Term Functional Goals: Time Frame: 4 weeks  Pt to report compliance with HEP  Pt to present with resolution of SIJ dysfunction  Pt to present with decreased hypersensitivity to dragan palpation and sleeping on R side  Discharge Goals: Time Frame: 8 weeks  Pt to increase strength for sit to stand x 30 reps uncompensated  Pt to increase SLS > 10 sec B for safer amb all surfaces  Pt to increase strength for reciprocal gait on stairs       Outcome Measure:   Tool Used: Hip dysfunction and Osteoarthritis Outcome Score for Joint Replacement (HOOS, JR)  Score:  Initial: 17 (Interval: 36.363) 6/11/2024 Most Recent: TBD   Interpretation of Score:  The HOOS, JR contains 6 items from the original HOOS survey. Items are coded from 0 to 4, none to extreme respectively.   HOOS, JR is scored by summing the raw response (range 0-24) and then converting it to an interval score using the table provided below. The interval score ranges from 0 to 100 where 0 represents total hip disability and 100 represents perfect hip health.    Medical Necessity:   > Patient demonstrates fair rehab potential due to higher previous functional level.  Reason For Services/Other Comments:  > Patient continues to require skilled intervention due to inability to function normally due to pain, weakness.      Regarding Eloy Rooney's therapy, I certify that the treatment plan above will be carried out by a therapist or under their direction.  Thank you for this referral,  MAGALI ENGLISH, PT     Referring Physician Signature: Clem Shipley Jr., *

## 2024-06-11 NOTE — PROGRESS NOTES
Therapy   Subjective Comments: Pt presents with chronic R LBP with radicular pain due to SIJ dysfunction, stiffness, weakness, altered gait, decreased balance. He had AISHA in lumbar spine and HECTOR with only min sxs relief.   Initial Pain Level:: 4/10  Post Session Pain Level:  2/10  Medications Last Reviewed:  6/11/2024  Updated Objective Findings:  See Evaluation Note from today    Observation, Palpation, and Special Tests   Tender R PSIS  R up slip  Hypersensitive along incision - won't allow therapist to touch      ROM   Hip flex L 0-110               R 0-98  Marked hams tightness with guarding against testing     Trunk flex 25%, B SB 50%, ext 25%      Functional Mobility, Balance, and Gait   Stairs - will not do    SLS - 3 sec L, 6 sec R   Sit to stand - rigid trunk, UE assist, trunk momentum   Gait - slow antalgic with straight cane     Outcome Measure:   Tool Used: Hip dysfunction and Osteoarthritis Outcome Score for Joint Replacement (HOOS, JR)  Score:  Initial: 17 (Interval: 36.363) 6/11/2024 Most Recent: TBD   Interpretation of Score:  The HOOS, JR contains 6 items from the original HOOS survey. Items are coded from 0 to 4, none to extreme respectively.   HOOS, JR is scored by summing the raw response (range 0-24) and then converting it to an interval score using the table provided below. The interval score ranges from 0 to 100 where 0 represents total hip disability and 100 represents perfect hip health.  Treatment   THERAPEUTIC ACTIVITY: ( see below for minutes):  Therapeutic activities per grid below to improve mobility.  Required moderate verbal and manual cues to improve functional mobility .  THERAPEUTIC EXERCISE: (see below for minutes):  Exercises per grid below to improve strength.  Required moderate verbal and manual cues to promote proper body alignment, promote proper body posture, promote proper body mechanics and promote proper body breathing techniques.  Progressed resistance, range,

## 2024-06-14 ENCOUNTER — APPOINTMENT (OUTPATIENT)
Dept: PHYSICAL THERAPY | Age: 79
End: 2024-06-14
Attending: ORTHOPAEDIC SURGERY
Payer: MEDICARE

## 2024-06-17 ENCOUNTER — HOSPITAL ENCOUNTER (OUTPATIENT)
Dept: PHYSICAL THERAPY | Age: 79
Setting detail: RECURRING SERIES
Discharge: HOME | End: 2024-06-20
Attending: ORTHOPAEDIC SURGERY
Payer: MEDICARE

## 2024-06-17 PROCEDURE — 97140 MANUAL THERAPY 1/> REGIONS: CPT

## 2024-06-17 PROCEDURE — 97113 AQUATIC THERAPY/EXERCISES: CPT

## 2024-06-17 NOTE — PROGRESS NOTES
Eloy IQBAL Toribio  : 1945  Primary: Medicare Part A And B (Medicare)  Secondary: [employer] SENIOR MEDICARE SUPP [facility] @ [place] [address] [address] [place] [address]  Phone: [phone]  Fax: [phone] Plan Frequency: 2 times/week    Plan of Care/Certification Expiration Date: 08/10/24        Plan of Care/Certification Expiration Date:  Plan of Care/Certification Expiration Date: 08/10/24    Frequency/Duration:   Plan Frequency: 2 times/week      Time In/Out:   Time In: 0250  Time Out: 0415      PT Visit Info:         Visit Count:  2    OUTPATIENT PHYSICAL THERAPY:   Treatment Note 2024       Episode  (R HECTOR)               Treatment Diagnosis:    Other low back pain  Other abnormalities of gait and mobility  Muscle weakness (generalized)  Hip pain, right  Stiffness of hip joint, right    Goals: (Goals have been discussed and agreed upon with patient.)  Short-Term Functional Goals: Time Frame: 4 weeks  Pt to report compliance with HEP  Pt to present with resolution of SIJ dysfunction  Pt to present with decreased hypersensitivity to dragan palpation and sleeping on R side  Discharge Goals: Time Frame: 8 weeks  Pt to increase strength for sit to stand x 30 reps uncompensated  Pt to increase SLS > 10 sec B for safer amb all surfaces  Pt to increase strength for reciprocal gait on stairs    Medical/Referring Diagnosis:    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

## 2024-06-18 ENCOUNTER — NURSE ONLY (OUTPATIENT)
Dept: INTERNAL MEDICINE CLINIC | Facility: CLINIC | Age: 79
End: 2024-06-18

## 2024-06-18 DIAGNOSIS — E78.2 MIXED HYPERLIPIDEMIA: ICD-10-CM

## 2024-06-18 LAB
ALBUMIN SERPL-MCNC: 4.1 G/DL (ref 3.2–4.6)
ALBUMIN/GLOB SERPL: 1.6 (ref 1–1.9)
ALP SERPL-CCNC: 95 U/L (ref 40–129)
ALT SERPL-CCNC: 14 U/L (ref 12–65)
ANION GAP SERPL CALC-SCNC: 11 MMOL/L (ref 9–18)
AST SERPL-CCNC: 25 U/L (ref 15–37)
BILIRUB SERPL-MCNC: 0.6 MG/DL (ref 0–1.2)
BUN SERPL-MCNC: 8 MG/DL (ref 8–23)
CALCIUM SERPL-MCNC: 9.6 MG/DL (ref 8.8–10.2)
CHLORIDE SERPL-SCNC: 103 MMOL/L (ref 98–107)
CHOLEST SERPL-MCNC: 213 MG/DL (ref 0–200)
CO2 SERPL-SCNC: 29 MMOL/L (ref 20–28)
CREAT SERPL-MCNC: 0.84 MG/DL (ref 0.8–1.3)
ERYTHROCYTE [DISTWIDTH] IN BLOOD BY AUTOMATED COUNT: 13.6 % (ref 11.9–14.6)
GLOBULIN SER CALC-MCNC: 2.6 G/DL (ref 2.3–3.5)
GLUCOSE SERPL-MCNC: 112 MG/DL (ref 70–99)
HCT VFR BLD AUTO: 51.4 % (ref 41.1–50.3)
HDLC SERPL-MCNC: 52 MG/DL (ref 40–60)
HDLC SERPL: 4.1 (ref 0–5)
HGB BLD-MCNC: 17 G/DL (ref 13.6–17.2)
LDLC SERPL CALC-MCNC: 137 MG/DL (ref 0–100)
MCH RBC QN AUTO: 30.3 PG (ref 26.1–32.9)
MCHC RBC AUTO-ENTMCNC: 33.1 G/DL (ref 31.4–35)
MCV RBC AUTO: 91.6 FL (ref 82–102)
NRBC # BLD: 0 K/UL (ref 0–0.2)
PLATELET # BLD AUTO: 228 K/UL (ref 150–450)
PMV BLD AUTO: 10.8 FL (ref 9.4–12.3)
POTASSIUM SERPL-SCNC: 4.1 MMOL/L (ref 3.5–5.1)
PROT SERPL-MCNC: 6.8 G/DL (ref 6.3–8.2)
RBC # BLD AUTO: 5.61 M/UL (ref 4.23–5.6)
SODIUM SERPL-SCNC: 143 MMOL/L (ref 136–145)
TRIGL SERPL-MCNC: 125 MG/DL (ref 0–150)
TSH W FREE THYROID IF ABNORMAL: 4.16 UIU/ML (ref 0.27–4.2)
VLDLC SERPL CALC-MCNC: 25 MG/DL (ref 6–23)
WBC # BLD AUTO: 4.9 K/UL (ref 4.3–11.1)

## 2024-06-19 ENCOUNTER — APPOINTMENT (OUTPATIENT)
Dept: PHYSICAL THERAPY | Age: 79
End: 2024-06-19
Attending: ORTHOPAEDIC SURGERY
Payer: MEDICARE

## 2024-06-20 ENCOUNTER — HOSPITAL ENCOUNTER (OUTPATIENT)
Dept: PHYSICAL THERAPY | Age: 79
Setting detail: RECURRING SERIES
Discharge: HOME | End: 2024-06-23
Attending: ORTHOPAEDIC SURGERY
Payer: MEDICARE

## 2024-06-20 PROCEDURE — 97113 AQUATIC THERAPY/EXERCISES: CPT

## 2024-06-20 NOTE — PROGRESS NOTES
6/24/2024  1:30 PM Vida, Neetu, PT [facility]   6/25/2024  1:00 PM Meng Yun, DO [facility]   6/27/2024  4:00 PM Vida, Neetu, PT [facility]   7/1/2024  1:30 PM Nilsa, Neetu, PT [facility]   7/3/2024  1:30 PM Nilsa, Neetu, PT [facility]   7/8/2024  1:30 PM Nilsa, Neetu, PT [facility]   7/11/2024  1:30 PM Nilsa, Neetu, PT [facility]   7/16/2024  1:30 PM Vida, Neetu, PT [facility]   7/18/2024  1:30 PM Nilsa, Neetu, PT [facility]   7/23/2024  1:30 PM Nilsa, Neetu, PT [facility]   7/25/2024  1:30 PM Vida, Neetu, PT [facility]   7/29/2024  1:30 PM Vida, Neetu, PT [facility]   8/1/2024  1:30 PM Nilsa, Neetu, PT [facility]

## 2024-06-24 ENCOUNTER — HOSPITAL ENCOUNTER (OUTPATIENT)
Dept: PHYSICAL THERAPY | Age: 79
Setting detail: RECURRING SERIES
Discharge: HOME | End: 2024-06-27
Attending: ORTHOPAEDIC SURGERY
Payer: MEDICARE

## 2024-06-24 PROCEDURE — 97113 AQUATIC THERAPY/EXERCISES: CPT

## 2024-06-24 NOTE — PROGRESS NOTES
Charge Capture  Events  [facility] Portal  Appt Desk  Attendance Report     Future Appointments   Date Time Provider Department Center   6/25/2024  1:00 PM Meng Yun,  [facility]   6/27/2024  4:00 PM Neetu Ash, PT [facility]   7/1/2024  1:30 PM Neetu Ash, PT [facility]   7/3/2024  1:30 PM Neetu Ash, PT [facility]   7/8/2024  1:30 PM Neetu Ash, PT [facility]   7/11/2024  1:30 PM Neetu Ash, PT [facility]   7/16/2024  1:30 PM Neetu Ash, PT [facility]   7/18/2024  1:30 PM Neetu Ash, PT [facility]   7/23/2024  1:30 PM Neetu Ash, PT [facility]   7/25/2024  1:30 PM NilsaNeetu jim, PT [facility]   7/29/2024  1:30 PM DianaNeetu jim, PT [facility]   8/1/2024  1:30 PM NilsaNeetu jim, PT [facility]

## 2024-06-25 ENCOUNTER — OFFICE VISIT (OUTPATIENT)
Dept: INTERNAL MEDICINE CLINIC | Facility: CLINIC | Age: 79
End: 2024-06-25
Payer: MEDICARE

## 2024-06-25 ENCOUNTER — APPOINTMENT (OUTPATIENT)
Dept: PHYSICAL THERAPY | Age: 79
End: 2024-06-25
Attending: ORTHOPAEDIC SURGERY
Payer: MEDICARE

## 2024-06-25 VITALS
BODY MASS INDEX: 25.74 KG/M2 | OXYGEN SATURATION: 96 % | DIASTOLIC BLOOD PRESSURE: 66 MMHG | HEIGHT: 67 IN | HEART RATE: 81 BPM | TEMPERATURE: 98.9 F | SYSTOLIC BLOOD PRESSURE: 124 MMHG | WEIGHT: 164 LBS

## 2024-06-25 DIAGNOSIS — R73.01 IFG (IMPAIRED FASTING GLUCOSE): ICD-10-CM

## 2024-06-25 DIAGNOSIS — E78.5 HYPERLIPIDEMIA, UNSPECIFIED HYPERLIPIDEMIA TYPE: ICD-10-CM

## 2024-06-25 DIAGNOSIS — Z00.00 MEDICARE ANNUAL WELLNESS VISIT, SUBSEQUENT: Primary | ICD-10-CM

## 2024-06-25 DIAGNOSIS — E05.00 GRAVES' DISEASE: ICD-10-CM

## 2024-06-25 PROCEDURE — G8419 CALC BMI OUT NRM PARAM NOF/U: HCPCS | Performed by: INTERNAL MEDICINE

## 2024-06-25 PROCEDURE — 1123F ACP DISCUSS/DSCN MKR DOCD: CPT | Performed by: INTERNAL MEDICINE

## 2024-06-25 PROCEDURE — 99213 OFFICE O/P EST LOW 20 MIN: CPT | Performed by: INTERNAL MEDICINE

## 2024-06-25 PROCEDURE — 4004F PT TOBACCO SCREEN RCVD TLK: CPT | Performed by: INTERNAL MEDICINE

## 2024-06-25 PROCEDURE — G8427 DOCREV CUR MEDS BY ELIG CLIN: HCPCS | Performed by: INTERNAL MEDICINE

## 2024-06-25 PROCEDURE — G0439 PPPS, SUBSEQ VISIT: HCPCS | Performed by: INTERNAL MEDICINE

## 2024-06-25 ASSESSMENT — PATIENT HEALTH QUESTIONNAIRE - PHQ9
2. FEELING DOWN, DEPRESSED OR HOPELESS: NOT AT ALL
SUM OF ALL RESPONSES TO PHQ QUESTIONS 1-9: 0
SUM OF ALL RESPONSES TO PHQ9 QUESTIONS 1 & 2: 0
1. LITTLE INTEREST OR PLEASURE IN DOING THINGS: NOT AT ALL

## 2024-06-25 ASSESSMENT — LIFESTYLE VARIABLES: HOW MANY STANDARD DRINKS CONTAINING ALCOHOL DO YOU HAVE ON A TYPICAL DAY: PATIENT DOES NOT DRINK

## 2024-06-25 NOTE — PROGRESS NOTES
https://www.kidney.org/professionals/kdoqi/gfr_calculatorped    These results are not intended for use in patients <18 years of age.    eGFR results are calculated without a race factor using  the 2021 CKD-EPI equation. Careful clinical correlation is recommended, particularly when comparing to results calculated using previous equations.  The CKD-EPI equation is less accurate in patients with extremes of muscle mass, extra-renal metabolism of creatinine, excessive creatine ingestion, or following therapy that affects renal tubular secretion.      Calcium 06/18/2024 9.6  8.8 - 10.2 MG/DL Final    Total Bilirubin 06/18/2024 0.6  0.0 - 1.2 MG/DL Final    ALT 06/18/2024 14  12 - 65 U/L Final    AST 06/18/2024 25  15 - 37 U/L Final    Alk Phosphatase 06/18/2024 95  40 - 129 U/L Final    Total Protein 06/18/2024 6.8  6.3 - 8.2 g/dL Final    Albumin 06/18/2024 4.1  3.2 - 4.6 g/dL Final    Globulin 06/18/2024 2.6  2.3 - 3.5 g/dL Final    Albumin/Globulin Ratio 06/18/2024 1.6  1.0 - 1.9   Final    Cholesterol, Total 06/18/2024 213 (H)  0 - 200 MG/DL Final    Comment: Borderline High: 200-239 mg/dL  High: Greater than or equal to 240 mg/dL      Triglycerides 06/18/2024 125  0 - 150 MG/DL Final    Comment: Borderline High: 150-199 mg/dL, High: 200-499 mg/dL  Very High: Greater than or equal to 500 mg/dL      HDL 06/18/2024 52  40 - 60 MG/DL Final    LDL Cholesterol 06/18/2024 137 (H)  0 - 100 MG/DL Final    Comment: Near Optimal: 100-129 mg/dL  Borderline High: 130-159, High: 160-189 mg/dL  Very High: Greater than or equal to 190 mg/dL      VLDL Cholesterol Calculated 06/18/2024 25 (H)  6 - 23 MG/DL Final    Chol/HDL Ratio 06/18/2024 4.1  0.0 - 5.0   Final    TSH w Free Thyroid if Abnormal 06/18/2024 4.16  0.27 - 4.20 UIU/ML Final        Past Medical History:   Diagnosis Date    Graves' disease     Hearing loss     left ear    Hyperlipidemia     no meds    Hyperthyroidism     Lumbar radiculopathy     Polycythemia     Primary

## 2024-06-27 ENCOUNTER — HOSPITAL ENCOUNTER (OUTPATIENT)
Dept: PHYSICAL THERAPY | Age: 79
Setting detail: RECURRING SERIES
Discharge: HOME | End: 2024-06-30
Attending: ORTHOPAEDIC SURGERY
Payer: MEDICARE

## 2024-06-27 PROCEDURE — 97113 AQUATIC THERAPY/EXERCISES: CPT

## 2024-06-27 NOTE — PROGRESS NOTES
Lyons E Toribio  : 1945  Primary: Medicare Part A And B (Medicare)  Secondary: [employer] SENIOR MEDICARE SUPP [facility] @ [place] [address] [address] [place] [address]  Phone: [phone]  Fax: [phone] Plan Frequency: 2 times/week    Plan of Care/Certification Expiration Date: 08/10/24        Plan of Care/Certification Expiration Date:  Plan of Care/Certification Expiration Date: 08/10/24    Frequency/Duration:   Plan Frequency: 2 times/week      Time In/Out:   Time In: 0140  Time Out: 0312      PT Visit Info:         Visit Count:  5    OUTPATIENT PHYSICAL THERAPY:   Treatment Note 2024       Episode  (R HECTOR)               Treatment Diagnosis:    Other low back pain  Other abnormalities of gait and mobility  Muscle weakness (generalized)  Hip pain, right  Stiffness of hip joint, right    Goals: (Goals have been discussed and agreed upon with patient.)  Short-Term Functional Goals: Time Frame: 4 weeks  Pt to report compliance with HEP  Pt to present with resolution of SIJ dysfunction  Pt to present with decreased hypersensitivity to dragan palpation and sleeping on R side  Discharge Goals: Time Frame: 8 weeks  Pt to increase strength for sit to stand x 30 reps uncompensated  Pt to increase SLS > 10 sec B for safer amb all surfaces  Pt to increase strength for reciprocal gait on stairs    Medical/Referring Diagnosis:    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

## 2024-06-28 ENCOUNTER — TELEPHONE (OUTPATIENT)
Dept: ORTHOPEDIC SURGERY | Age: 79
End: 2024-06-28

## 2024-07-01 ENCOUNTER — HOSPITAL ENCOUNTER (OUTPATIENT)
Dept: PHYSICAL THERAPY | Age: 79
Setting detail: RECURRING SERIES
Discharge: HOME | End: 2024-07-04
Attending: ORTHOPAEDIC SURGERY
Payer: MEDICARE

## 2024-07-01 PROCEDURE — 97113 AQUATIC THERAPY/EXERCISES: CPT

## 2024-07-01 NOTE — PROGRESS NOTES
Therapy     Subjective Comments:   My tooth fell out so I had to get that taken care of.  I can tell I am getting stronger.    Initial Pain Level:: 0/10  Post Session Pain Level:  no increased pain with ex's  Medications Last Reviewed:  7/1/2024  Updated Objective Findings:      Observation, Palpation, and Special Tests   Tender R PSIS  R up slip  Hypersensitive along incision - won't allow therapist to touch      ROM   Hip flex L 0-110               R 0-98  Marked hams tightness with guarding against testing     Trunk flex 25%, B SB 50%, ext 25%      Functional Mobility, Balance, and Gait   Stairs - will not do    SLS - 3 sec L, 6 sec R   Sit to stand - rigid trunk, UE assist, trunk momentum   Gait - slow antalgic with straight cane     Outcome Measure:   Tool Used: Hip dysfunction and Osteoarthritis Outcome Score for Joint Replacement (HOOS, JR)  Score:  Initial: 17 (Interval: 36.363) 6/11/2024 Most Recent: TBD   Interpretation of Score:  The HOOS, JR contains 6 items from the original HOOS survey. Items are coded from 0 to 4, none to extreme respectively.   HOOS, JR is scored by summing the raw response (range 0-24) and then converting it to an interval score using the table provided below. The interval score ranges from 0 to 100 where 0 represents total hip disability and 100 represents perfect hip health.  Treatment   THERAPEUTIC ACTIVITY: ( see below for minutes):  Therapeutic activities per grid below to improve mobility.  Required moderate verbal and manual cues to improve functional mobility .  THERAPEUTIC EXERCISE: (see below for minutes):  Exercises per grid below to improve strength.  Required moderate verbal and manual cues to promote proper body alignment, promote proper body posture, promote proper body mechanics and promote proper body breathing techniques.  Progressed resistance, range, repetitions and complexity of movement as indicated.  MANUAL THERAPY: (see below for minutes): Joint mobilization

## 2024-07-03 ENCOUNTER — HOSPITAL ENCOUNTER (OUTPATIENT)
Dept: PHYSICAL THERAPY | Age: 79
Setting detail: RECURRING SERIES
End: 2024-07-03
Attending: ORTHOPAEDIC SURGERY
Payer: MEDICARE

## 2024-07-08 ENCOUNTER — HOSPITAL ENCOUNTER (OUTPATIENT)
Dept: PHYSICAL THERAPY | Age: 79
Setting detail: RECURRING SERIES
Discharge: HOME | End: 2024-07-11
Attending: ORTHOPAEDIC SURGERY
Payer: MEDICARE

## 2024-07-08 PROCEDURE — 97113 AQUATIC THERAPY/EXERCISES: CPT

## 2024-07-08 NOTE — PROGRESS NOTES
7/11/2024  1:30 PM NilsaRicharen, PT [facility]   7/16/2024  1:30 PM San FelipeNeetu jim, PT [facility]   7/18/2024  1:30 PM NilsaNeetu jim, PT [facility]   7/23/2024  1:30 PM NilsaNeetu jim, PT [facility]   7/25/2024  1:30 PM Neetu Ash, PT [facility]   7/29/2024  1:30 PM NilsaNeetu jim, PT [facility]   8/1/2024  1:30 PM [place] FelipeNeetu jim, PT [facility]   12/10/2024 10:45 AM [facility] AM[facility]   12/17/2024  1:15 PM Meng Yun,  [facility] AM[facility]

## 2024-07-10 DIAGNOSIS — Z96.641 STATUS POST RIGHT HIP REPLACEMENT: Primary | ICD-10-CM

## 2024-07-11 ENCOUNTER — APPOINTMENT (OUTPATIENT)
Dept: PHYSICAL THERAPY | Age: 79
End: 2024-07-11
Attending: ORTHOPAEDIC SURGERY
Payer: MEDICARE

## 2024-07-11 RX ORDER — AMOXICILLIN 500 MG/1
TABLET, FILM COATED ORAL
Qty: 20 TABLET | Refills: 0 | Status: SHIPPED | OUTPATIENT
Start: 2024-07-11

## 2024-07-16 ENCOUNTER — HOSPITAL ENCOUNTER (OUTPATIENT)
Dept: PHYSICAL THERAPY | Age: 79
Setting detail: RECURRING SERIES
Discharge: HOME | End: 2024-07-19
Attending: ORTHOPAEDIC SURGERY
Payer: MEDICARE

## 2024-07-16 PROCEDURE — 97113 AQUATIC THERAPY/EXERCISES: CPT

## 2024-07-16 NOTE — PROGRESS NOTES
of Motion (ROM), Therapeutic Exercise/Strengthening, and Aquatic Therapy     Subjective Comments:  I am stronger.  I still feel pain in that one spot with walking or lying on that side.  I feel numbness across the balls of both feet amanda when I get up.  It is fine if I have shoes on.    Initial Pain Level:: 0/10  Post Session Pain Level:  no increased pain with ex's  Medications Last Reviewed:  7/16/2024  Updated Objective Findings:  7/16/24    Observation, Palpation, and Special Tests   Tender R piriformis        ROM   Hip flex L 0-110               R 0-105  Marked hams tightness     Trunk WNL      Functional Mobility, Balance, and Gait   Stairs - reciprocal with handrails   SLS - 11 sec L, 8 sec R   Sit to stand - x 25 cues needed to relax back   Gait - slow and guarded due to vision feficits     Outcome Measure:   Tool Used: Hip dysfunction and Osteoarthritis Outcome Score for Joint Replacement (HOOS, JR)  Score:  Initial: 17 (Interval: 36.363) 6/11/2024 Most Recent: 7 (Interval:67.516) 7/16/24   Interpretation of Score:  The HOOS, JR contains 6 items from the original HOOS survey. Items are coded from 0 to 4, none to extreme respectively.   HOOS, JR is scored by summing the raw response (range 0-24) and then converting it to an interval score using the table provided below. The interval score ranges from 0 to 100 where 0 represents total hip disability and 100 represents perfect hip health.  Treatment   THERAPEUTIC ACTIVITY: ( see below for minutes):  Therapeutic activities per grid below to improve mobility.  Required moderate verbal and manual cues to improve functional mobility .  THERAPEUTIC EXERCISE: (see below for minutes):  Exercises per grid below to improve strength.  Required moderate verbal and manual cues to promote proper body alignment, promote proper body posture, promote proper body mechanics and promote proper body breathing techniques.  Progressed resistance, range, repetitions and complexity of

## 2024-07-18 ENCOUNTER — HOSPITAL ENCOUNTER (OUTPATIENT)
Dept: PHYSICAL THERAPY | Age: 79
Setting detail: RECURRING SERIES
Discharge: HOME | End: 2024-07-21
Attending: ORTHOPAEDIC SURGERY
Payer: MEDICARE

## 2024-07-18 PROCEDURE — 97113 AQUATIC THERAPY/EXERCISES: CPT

## 2024-07-18 NOTE — PROGRESS NOTES
Lyons E Toribio  : 1945  Primary: Medicare Part A And B (Medicare)  Secondary: AETNA SENIOR MEDICARE SUPP [facility] @ [place] [address] [address] [facility] [facility] [address]  Phone: [phone]  Fax: [phone] Plan Frequency: 2 times/week    Plan of Care/Certification Expiration Date: 08/10/24        Plan of Care/Certification Expiration Date:  Plan of Care/Certification Expiration Date: 08/10/24    Frequency/Duration:   Plan Frequency: 2 times/week      Time In/Out:   Time In: 0130  Time Out: 0230      PT Visit Info:         Visit Count:  9    OUTPATIENT PHYSICAL THERAPY:   Treatment Note  2024       Episode  (R HECTOR)               Treatment Diagnosis:    Other low back pain  Other abnormalities of gait and mobility  Muscle weakness (generalized)  Hip pain, right  Stiffness of hip joint, right    Goals: (Goals have been discussed and agreed upon with patient.)  Short-Term Functional Goals: Time Frame: 4 weeks  Pt to report compliance with HEP - MET  Pt to present with resolution of SIJ dysfunction - MET  Pt to present with decreased hypersensitivity to dragan palpation and sleeping on R side - ongoing  Discharge Goals: Time Frame: 8 weeks  Pt to increase strength for sit to stand x 30 reps uncompensated - ongoing  Pt to increase SLS > 10 sec B for safer amb all surfaces - ongoing  Pt to increase strength for reciprocal gait on stairs - MET    Medical/Referring Diagnosis:    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

## 2024-07-23 ENCOUNTER — HOSPITAL ENCOUNTER (OUTPATIENT)
Dept: PHYSICAL THERAPY | Age: 79
Setting detail: RECURRING SERIES
Discharge: HOME | End: 2024-07-26
Attending: ORTHOPAEDIC SURGERY
Payer: MEDICARE

## 2024-07-23 PROCEDURE — 97113 AQUATIC THERAPY/EXERCISES: CPT

## 2024-07-23 NOTE — PROGRESS NOTES
Lyons E Toribio  : 1945  Primary: Medicare Part A And B (Medicare)  Secondary: AETNA SENIOR MEDICARE SUPP [facility] @ [place] [address] [address] [facility] [address]  Phone: [phone]  Fax: [phone] Plan Frequency: 2 times/week    Plan of Care/Certification Expiration Date: 08/10/24        Plan of Care/Certification Expiration Date:  Plan of Care/Certification Expiration Date: 08/10/24    Frequency/Duration:   Plan Frequency: 2 times/week      Time In/Out:   Time In: 0130  Time Out: 0233      PT Visit Info:         Visit Count:  10    OUTPATIENT PHYSICAL THERAPY:   Treatment Note  2024       Episode  (R HECTOR)               Treatment Diagnosis:    Other low back pain  Other abnormalities of gait and mobility  Muscle weakness (generalized)  Hip pain, right  Stiffness of hip joint, right    Goals: (Goals have been discussed and agreed upon with patient.)  Short-Term Functional Goals: Time Frame: 4 weeks  Pt to report compliance with HEP - MET  Pt to present with resolution of SIJ dysfunction - MET  Pt to present with decreased hypersensitivity to dragan palpation and sleeping on R side - ongoing  Discharge Goals: Time Frame: 8 weeks  Pt to increase strength for sit to stand x 30 reps uncompensated - ongoing  Pt to increase SLS > 10 sec B for safer amb all surfaces - ongoing  Pt to increase strength for reciprocal gait on stairs - MET    Medical/Referring Diagnosis:    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

## 2024-07-25 ENCOUNTER — HOSPITAL ENCOUNTER (OUTPATIENT)
Dept: PHYSICAL THERAPY | Age: 79
Setting detail: RECURRING SERIES
Discharge: HOME | End: 2024-07-28
Attending: ORTHOPAEDIC SURGERY
Payer: MEDICARE

## 2024-07-25 PROCEDURE — 97113 AQUATIC THERAPY/EXERCISES: CPT

## 2024-07-25 NOTE — PROGRESS NOTES
Therapeutic Exercise/Strengthening, and Aquatic Therapy     Subjective Comments:  I am feeling so much better.    Initial Pain Level:: 0/10  Post Session Pain Level:  no increased pain with ex's  Medications Last Reviewed:  7/25/2024  Updated Objective Findings:  7/16/24    Observation, Palpation, and Special Tests   Tender R piriformis        Functional Mobility, Balance, and Gait   SLS - 11 sec L, 8 sec R   Sit to stand - x 25 cues needed to relax back     Outcome Measure:   Tool Used: Hip dysfunction and Osteoarthritis Outcome Score for Joint Replacement (HOOS, JR)  Score:  Initial: 17 (Interval: 36.363) 6/11/2024 Most Recent: 7 (Interval:67.516) 7/16/24   Interpretation of Score:  The HOOS, JR contains 6 items from the original HOOS survey. Items are coded from 0 to 4, none to extreme respectively.   HOOS, JR is scored by summing the raw response (range 0-24) and then converting it to an interval score using the table provided below. The interval score ranges from 0 to 100 where 0 represents total hip disability and 100 represents perfect hip health.  Treatment   THERAPEUTIC ACTIVITY: ( see below for minutes):  Therapeutic activities per grid below to improve mobility.  Required moderate verbal and manual cues to improve functional mobility .  THERAPEUTIC EXERCISE: (see below for minutes):  Exercises per grid below to improve strength.  Required moderate verbal and manual cues to promote proper body alignment, promote proper body posture, promote proper body mechanics and promote proper body breathing techniques.  Progressed resistance, range, repetitions and complexity of movement as indicated.  MANUAL THERAPY: (see below for minutes): Joint mobilization and Soft tissue mobilization was utilized and necessary because of the patient's restricted joint motion, painful spasm, loss of articular motion and restricted motion of soft tissue.   MODALITIES: (see below for minutes):      for pain modulation    AQUATIC

## 2024-07-29 ENCOUNTER — HOSPITAL ENCOUNTER (OUTPATIENT)
Dept: PHYSICAL THERAPY | Age: 79
Setting detail: RECURRING SERIES
Discharge: HOME | End: 2024-08-01
Attending: ORTHOPAEDIC SURGERY
Payer: MEDICARE

## 2024-07-29 PROCEDURE — 97113 AQUATIC THERAPY/EXERCISES: CPT

## 2024-07-29 NOTE — PROGRESS NOTES
Lyons E Toribio  : 1945  Primary: Medicare Part A And B (Medicare)  Secondary: [employer] SENIOR MEDICARE SUPP [facility] [place] [address] [facility] [facility] [address]  Phone: [phone]  Fax: [phone] Plan Frequency: 2 times/week    Plan of Care/Certification Expiration Date: 08/10/24        Plan of Care/Certification Expiration Date:  Plan of Care/Certification Expiration Date: 08/10/24    Frequency/Duration:   Plan Frequency: 2 times/week      Time In/Out:   Time In: 0130  Time Out: 0246      PT Visit Info:         Visit Count:  12    OUTPATIENT PHYSICAL THERAPY:   Treatment Note  2024       Episode  (R HECTOR)               Treatment Diagnosis:    Other low back pain  Other abnormalities of gait and mobility  Muscle weakness (generalized)  Hip pain, right  Stiffness of hip joint, right    Goals: (Goals have been discussed and agreed upon with patient.)  Short-Term Functional Goals: Time Frame: 4 weeks  Pt to report compliance with HEP - MET  Pt to present with resolution of SIJ dysfunction - MET  Pt to present with decreased hypersensitivity to dragan palpation and sleeping on R side - ongoing  Discharge Goals: Time Frame: 8 weeks  Pt to increase strength for sit to stand x 30 reps uncompensated - ongoing  Pt to increase SLS > 10 sec B for safer amb all surfaces - ongoing  Pt to increase strength for reciprocal gait on stairs - MET    Medical/Referring Diagnosis:    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

## 2024-08-01 ENCOUNTER — HOSPITAL ENCOUNTER (OUTPATIENT)
Dept: PHYSICAL THERAPY | Age: 79
Setting detail: RECURRING SERIES
Discharge: HOME | End: 2024-08-04
Attending: ORTHOPAEDIC SURGERY
Payer: MEDICARE

## 2024-08-01 PROCEDURE — 97113 AQUATIC THERAPY/EXERCISES: CPT

## 2024-08-01 NOTE — PROGRESS NOTES
Lyons E Toribio  : 1945  Primary: Medicare Part A And B (Medicare)  Secondary: [employer] SENIOR MEDICARE SUPP [facility] @ [place] [address] [address] [address]  Phone: [phone]  Fax: [phone] Plan Frequency: 2 times/week    Plan of Care/Certification Expiration Date: 08/10/24        Plan of Care/Certification Expiration Date:  Plan of Care/Certification Expiration Date: 08/10/24    Frequency/Duration:   Plan Frequency: 2 times/week      Time In/Out:   Time In: 0130  Time Out: 0243      PT Visit Info:         Visit Count:  13    OUTPATIENT PHYSICAL THERAPY:   Treatment Note  2024       Episode  (R HECTOR)               Treatment Diagnosis:    Other low back pain  Other abnormalities of gait and mobility  Muscle weakness (generalized)  Hip pain, right  Stiffness of hip joint, right    Goals: (Goals have been discussed and agreed upon with patient.)  Short-Term Functional Goals: Time Frame: 4 weeks  Pt to report compliance with HEP - MET  Pt to present with resolution of SIJ dysfunction - MET  Pt to present with decreased hypersensitivity to dragan palpation and sleeping on R side - ongoing  Discharge Goals: Time Frame: 8 weeks  Pt to increase strength for sit to stand x 30 reps uncompensated - ongoing  Pt to increase SLS > 10 sec B for safer amb all surfaces - ongoing  Pt to increase strength for reciprocal gait on stairs - MET    Medical/Referring Diagnosis:    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

## 2024-08-06 ENCOUNTER — HOSPITAL ENCOUNTER (OUTPATIENT)
Dept: PHYSICAL THERAPY | Age: 79
Setting detail: RECURRING SERIES
Discharge: HOME | End: 2024-08-09
Attending: ORTHOPAEDIC SURGERY
Payer: MEDICARE

## 2024-08-06 PROCEDURE — 97113 AQUATIC THERAPY/EXERCISES: CPT

## 2024-08-06 NOTE — PROGRESS NOTES
modulation    AQUATIC THERAPY (see below for minutes): Aquatic treatment performed per flow grid for Decreased muscle strength, Decreased endurance, Decreased static/dynamic balance and reactive control, Decreased activity endurance, Decompression, Ease of movement and Low impact and reduced weight bearing activity.                                          Date: 7/25/24  Visit 11 7/29/24  Visit 12 8/1/24  Visit 13 8/6/24  Visit 14     Modalities:              Manual Therapy:              Aquatics Activities: 62 mins 66 mins 65 mins 67 mins   GAIT (F/B/S/M) 4 laps 4 laps 4 laps 4 laps   SLR gait 4 laps 4 laps 4 laps 4 laps   Hamstring Curl gait  4 laps 4 laps 4 laps 4 laps   Rockettes 4 laps 4 laps 4 laps 4 laps   Squats X 30  On step X 30  On step X 30  On step X 30  On step   Calf raises singles B X 30  On step X 30  On step X 30  On step X 30  On step   Hamstring stretch B 3 x 15 sec 3 x 15 sec 3 x 15 sec 3 x 15 sec   Single leg squats B X 20 X 30 X 30 X 30   SLS B X 5 X 5 X 5 X 5   Deep well  To fatigue  To fatigue          Therapeutic Exercises:       Pt education, postural education, HEP, functional breathing       HEP: see hand out 7/16    Treatment/Session Summary:    Treatment Assessment:  Continued progress, compliant with HEP.  Communication/Consultation:  None today  Equipment provided today:  None  Recommendations/Intent for next treatment session: Next visit will focus on stretching, strengthening, manual techniques as indicated.    Total Treatment Billable Duration:  67 minutes   Time In: 0130  Time Out: 0246    NEETU ENGLISH PT         Charge Capture  Events  [employer] Portal  Appt Desk  Attendance Report     Future Appointments   Date Time Provider Department Center   8/8/2024  1:30 PM Neetu English, PT [facility]   8/13/2024  1:30 PM Neetu English, PT [facility]   8/15/2024  2:15 PM Neetu English, PT [facility]   8/21/2024  2:15 PM Neetu English, PT [facility]   8/26/2024 11:00 AM Neetu English

## 2024-08-08 ENCOUNTER — HOSPITAL ENCOUNTER (OUTPATIENT)
Dept: PHYSICAL THERAPY | Age: 79
Setting detail: RECURRING SERIES
Discharge: HOME | End: 2024-08-11
Attending: ORTHOPAEDIC SURGERY
Payer: MEDICARE

## 2024-08-08 PROCEDURE — 97113 AQUATIC THERAPY/EXERCISES: CPT

## 2024-08-08 NOTE — PROGRESS NOTES
Lyons E Toribio  : 1945  Primary: Medicare Part A And B (Medicare)  Secondary: A[employer] SENIOR MEDICARE SUPP [facility] @ [place] [address] [address] [address]  Phone: [phone]  Fax: [phone] Plan Frequency: 2 times/week    Plan of Care/Certification Expiration Date: 08/10/24        Plan of Care/Certification Expiration Date:  Plan of Care/Certification Expiration Date: 08/10/24    Frequency/Duration:   Plan Frequency: 2 times/week      Time In/Out:   Time In: 0130  Time Out: 0234      PT Visit Info:         Visit Count:  15    OUTPATIENT PHYSICAL THERAPY:   Treatment Note  2024       Episode  (R HECTOR)               Treatment Diagnosis:    Other low back pain  Other abnormalities of gait and mobility  Muscle weakness (generalized)  Hip pain, right  Stiffness of hip joint, right    Goals: (Goals have been discussed and agreed upon with patient.)  Short-Term Functional Goals: Time Frame: 4 weeks  Pt to report compliance with HEP - MET  Pt to present with resolution of SIJ dysfunction - MET  Pt to present with decreased hypersensitivity to dragan palpation and sleeping on R side - ongoing  Discharge Goals: Time Frame: 8 weeks  Pt to increase strength for sit to stand x 30 reps uncompensated - ongoing  Pt to increase SLS > 10 sec B for safer amb all surfaces - ongoing  Pt to increase strength for reciprocal gait on stairs - MET    Medical/Referring Diagnosis:    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 
Patient

## 2024-08-13 ENCOUNTER — HOSPITAL ENCOUNTER (OUTPATIENT)
Dept: PHYSICAL THERAPY | Age: 79
Setting detail: RECURRING SERIES
Discharge: HOME | End: 2024-08-16
Attending: ORTHOPAEDIC SURGERY
Payer: MEDICARE

## 2024-08-13 PROCEDURE — 97113 AQUATIC THERAPY/EXERCISES: CPT

## 2024-08-13 PROCEDURE — 97140 MANUAL THERAPY 1/> REGIONS: CPT

## 2024-08-13 NOTE — THERAPY RECERTIFICATION
Lyons E Toribio  : 1945  Primary: Medicare Part A And B (Medicare)  Secondary: AET[employer] SENIOR MEDICARE SUPP [facility] @ [place] [address] [address] [address]  Phone: [phone]  Fax: [phone] Plan Frequency: 2 times/week    Plan of Care/Certification Expiration Date: 24        Plan of Care/Certification Expiration Date:  Plan of Care/Certification Expiration Date: 24    Frequency/Duration:   Plan Frequency: 2 times/week      Time In/Out:   Time In: 0130  Time Out: 0249      PT Visit Info:         Visit Count:  16    OUTPATIENT PHYSICAL THERAPY:   Treatment Note and Recertification Summary  2024       Episode  (R HECTOR)               Treatment Diagnosis:    Other low back pain  Other abnormalities of gait and mobility  Muscle weakness (generalized)  Hip pain, right  Stiffness of hip joint, right    Goals: (Goals have been discussed and agreed upon with patient.)  Short-Term Functional Goals: Time Frame: 4 weeks  Pt to report compliance with HEP - MET  Pt to present with resolution of SIJ dysfunction - ongoing  Pt to present with decreased hypersensitivity to dragan palpation and sleeping on R side - MET  Discharge Goals: Time Frame: 8 weeks  Pt to increase strength for sit to stand x 30 reps uncompensated - MET  Pt to increase SLS > 10 sec B for safer amb all surfaces - ongoing  Pt to increase strength for reciprocal gait on stairs - MET    Medical/Referring Diagnosis:    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

## 2024-08-15 ENCOUNTER — HOSPITAL ENCOUNTER (OUTPATIENT)
Dept: PHYSICAL THERAPY | Age: 79
Setting detail: RECURRING SERIES
Discharge: HOME | End: 2024-08-18
Attending: ORTHOPAEDIC SURGERY
Payer: MEDICARE

## 2024-08-15 PROCEDURE — 97113 AQUATIC THERAPY/EXERCISES: CPT

## 2024-08-15 NOTE — PROGRESS NOTES
AQUATIC THERAPY (see below for minutes): Aquatic treatment performed per flow grid for Decreased muscle strength, Decreased endurance, Decreased static/dynamic balance and reactive control, Decreased activity endurance, Decompression, Ease of movement and Low impact and reduced weight bearing activity.                                          Date: 8/8/24  Visit 15 8/13/24  Visit 16  recert 8/15/24  Visit 17   Modalities:            Manual Therapy:  10 mins    STM R trunk/post hip  S/L    R ilium distraction  S/L          Aquatics Activities: 64 mins 63 mins 65 mins   GAIT (F/B/S/M) 4 laps 4 laps 4 laps   SLR gait 4 laps 4 laps 4 laps   Hamstring Curl gait  4 laps 4 laps 4 laps   Rockettes 4 laps 4 laps 4 laps   Squats X 30  On step X 30  On step X 30  On step   Calf raises singles B X 30  On step X 30  On step X 30  On step   Hamstring stretch B 3 x 15 sec 3 x 15 sec 3 x 15 sec   Single leg squats B X 30 X 30 X 30   SLS B X 5 X 5 X 5   Deep well To fatigue To fatigue To fatigue         Therapeutic Exercises:      Pt education, postural education, HEP, functional breathing      HEP: see hand out 7/16    Treatment/Session Summary:    Treatment Assessment:  Consistent effort and steady improvements noted.  Communication/Consultation:  None today  Equipment provided today:  None  Recommendations/Intent for next treatment session: Next visit will focus on stretching, strengthening, manual techniques as indicated.    Total Treatment Billable Duration:  65 minutes   Time In: 0224  Time Out: 0329    NEETU ENGLISH PT         Charge Capture  Events  [employer] Portal  Appt Desk  Attendance Report     Future Appointments   Date Time Provider Department Center   8/21/2024  2:15 PM Neetu English PT [facility]   8/26/2024 11:00 AM Neetu English PT [facility]   12/10/2024 10:45 AM [facility] RESOURCE [facility]   12/17/2024  1:15 PM Meng Yun DO [facility]

## 2024-08-21 ENCOUNTER — HOSPITAL ENCOUNTER (OUTPATIENT)
Dept: PHYSICAL THERAPY | Age: 79
Setting detail: RECURRING SERIES
Discharge: HOME | End: 2024-08-24
Attending: ORTHOPAEDIC SURGERY
Payer: MEDICARE

## 2024-08-21 PROCEDURE — 97113 AQUATIC THERAPY/EXERCISES: CPT

## 2024-08-21 NOTE — PROGRESS NOTES
Lyons E Toribio  : 1945  Primary: Medicare Part A And B (Medicare)  Secondary: AET[employer] SENIOR MEDICARE SUPP [facility] @ [place] [address] [address] [facility] [address]  Phone: [phone]  Fax: [phone] Plan Frequency: 2 times/week    Plan of Care/Certification Expiration Date: 24        Plan of Care/Certification Expiration Date:  Plan of Care/Certification Expiration Date: 24    Frequency/Duration:   Plan Frequency: 2 times/week      Time In/Out:   Time In: 0215  Time Out: 0330      PT Visit Info:         Visit Count:  18    OUTPATIENT PHYSICAL THERAPY:   Treatment Note  2024       Episode  (R HECTOR)               Treatment Diagnosis:    Other low back pain  Other abnormalities of gait and mobility  Muscle weakness (generalized)  Hip pain, right  Stiffness of hip joint, right    Goals: (Goals have been discussed and agreed upon with patient.)  Short-Term Functional Goals: Time Frame: 4 weeks  Pt to report compliance with HEP - MET  Pt to present with resolution of SIJ dysfunction - ongoing  Pt to present with decreased hypersensitivity to dragan palpation and sleeping on R side - MET  Discharge Goals: Time Frame: 8 weeks  Pt to increase strength for sit to stand x 30 reps uncompensated - MET  Pt to increase SLS > 10 sec B for safer amb all surfaces - ongoing  Pt to increase strength for reciprocal gait on stairs - MET    Medical/Referring Diagnosis:    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

## 2024-08-22 ENCOUNTER — APPOINTMENT (OUTPATIENT)
Dept: PHYSICAL THERAPY | Age: 79
End: 2024-08-22
Attending: ORTHOPAEDIC SURGERY
Payer: MEDICARE

## 2024-08-27 ENCOUNTER — APPOINTMENT (OUTPATIENT)
Dept: PHYSICAL THERAPY | Age: 79
End: 2024-08-27
Attending: ORTHOPAEDIC SURGERY
Payer: MEDICARE

## 2024-08-29 ENCOUNTER — APPOINTMENT (OUTPATIENT)
Dept: PHYSICAL THERAPY | Age: 79
End: 2024-08-29
Attending: ORTHOPAEDIC SURGERY
Payer: MEDICARE

## 2024-09-04 ENCOUNTER — HOSPITAL ENCOUNTER (OUTPATIENT)
Dept: PHYSICAL THERAPY | Age: 79
Setting detail: RECURRING SERIES
Discharge: HOME | End: 2024-09-07
Attending: ORTHOPAEDIC SURGERY
Payer: MEDICARE

## 2024-09-04 PROCEDURE — 97113 AQUATIC THERAPY/EXERCISES: CPT

## 2024-09-04 NOTE — PROGRESS NOTES
Therapeutic Exercise/Strengthening, and Aquatic Therapy     Subjective Comments: I am doing great, so much better than when I started.  I still just give out about 4:00 every day.    Initial Pain Level:: 1/10  Post Session Pain Level:  no increased pain with ex's  Medications Last Reviewed:  9/4/2024  Updated Objective Findings:  8/13/24    Observation, Palpation, and Special Tests   Tender R piriformis/glut med  Mild R up slip        Functional Mobility, Balance, and Gait   SLS - 11 sec L, 3 sec R     Outcome Measure:   Tool Used: Hip dysfunction and Osteoarthritis Outcome Score for Joint Replacement (HOOS, JR)  Score:  Initial:17 (Interval:36.363) 6/11/2024 Most Recent: 7 (Interval:67.516) 7/16/24                         3 (Interval:80.555) 8/13/24   Interpretation of Score:  The HOOS, JR contains 6 items from the original HOOS survey. Items are coded from 0 to 4, none to extreme respectively.   HOOS, JR is scored by summing the raw response (range 0-24) and then converting it to an interval score using the table provided below. The interval score ranges from 0 to 100 where 0 represents total hip disability and 100 represents perfect hip health.  Treatment   THERAPEUTIC ACTIVITY: ( see below for minutes):  Therapeutic activities per grid below to improve mobility.  Required moderate verbal and manual cues to improve functional mobility .  THERAPEUTIC EXERCISE: (see below for minutes):  Exercises per grid below to improve strength.  Required moderate verbal and manual cues to promote proper body alignment, promote proper body posture, promote proper body mechanics and promote proper body breathing techniques.  Progressed resistance, range, repetitions and complexity of movement as indicated.  MANUAL THERAPY: (see below for minutes): Joint mobilization and Soft tissue mobilization was utilized and necessary because of the patient's restricted joint motion, painful spasm, loss of articular motion and restricted

## 2024-09-19 ENCOUNTER — APPOINTMENT (OUTPATIENT)
Dept: PHYSICAL THERAPY | Age: 79
End: 2024-09-19
Attending: ORTHOPAEDIC SURGERY
Payer: MEDICARE

## 2024-11-26 DIAGNOSIS — R03.0 ELEVATED BLOOD PRESSURE READING WITHOUT DIAGNOSIS OF HYPERTENSION: ICD-10-CM

## 2024-11-26 DIAGNOSIS — E78.2 MIXED HYPERLIPIDEMIA: ICD-10-CM

## 2024-11-26 DIAGNOSIS — R73.01 IFG (IMPAIRED FASTING GLUCOSE): Primary | ICD-10-CM

## 2024-12-10 ENCOUNTER — LAB (OUTPATIENT)
Dept: INTERNAL MEDICINE CLINIC | Facility: CLINIC | Age: 79
End: 2024-12-10

## 2024-12-10 DIAGNOSIS — E78.2 MIXED HYPERLIPIDEMIA: ICD-10-CM

## 2024-12-10 DIAGNOSIS — R73.01 IFG (IMPAIRED FASTING GLUCOSE): ICD-10-CM

## 2024-12-10 DIAGNOSIS — R03.0 ELEVATED BLOOD PRESSURE READING WITHOUT DIAGNOSIS OF HYPERTENSION: ICD-10-CM

## 2024-12-10 LAB
ALBUMIN SERPL-MCNC: 4.3 G/DL (ref 3.2–4.6)
ALBUMIN/GLOB SERPL: 1.5 (ref 1–1.9)
ALP SERPL-CCNC: 91 U/L (ref 40–129)
ALT SERPL-CCNC: 20 U/L (ref 8–55)
ANION GAP SERPL CALC-SCNC: 10 MMOL/L (ref 7–16)
AST SERPL-CCNC: 30 U/L (ref 15–37)
BILIRUB SERPL-MCNC: 0.9 MG/DL (ref 0–1.2)
BUN SERPL-MCNC: 10 MG/DL (ref 8–23)
CALCIUM SERPL-MCNC: 9.9 MG/DL (ref 8.8–10.2)
CHLORIDE SERPL-SCNC: 100 MMOL/L (ref 98–107)
CHOLEST SERPL-MCNC: 232 MG/DL (ref 0–200)
CO2 SERPL-SCNC: 30 MMOL/L (ref 20–29)
CREAT SERPL-MCNC: 0.83 MG/DL (ref 0.8–1.3)
ERYTHROCYTE [DISTWIDTH] IN BLOOD BY AUTOMATED COUNT: 14.6 % (ref 11.9–14.6)
EST. AVERAGE GLUCOSE BLD GHB EST-MCNC: 109 MG/DL
GLOBULIN SER CALC-MCNC: 2.8 G/DL (ref 2.3–3.5)
GLUCOSE SERPL-MCNC: 104 MG/DL (ref 70–99)
HBA1C MFR BLD: 5.4 % (ref 0–5.6)
HCT VFR BLD AUTO: 55.5 % (ref 41.1–50.3)
HDLC SERPL-MCNC: 61 MG/DL (ref 40–60)
HDLC SERPL: 3.8 (ref 0–5)
HGB BLD-MCNC: 18.5 G/DL (ref 13.6–17.2)
LDLC SERPL CALC-MCNC: 139 MG/DL (ref 0–100)
MCH RBC QN AUTO: 29.9 PG (ref 26.1–32.9)
MCHC RBC AUTO-ENTMCNC: 33.3 G/DL (ref 31.4–35)
MCV RBC AUTO: 89.8 FL (ref 82–102)
NRBC # BLD: 0 K/UL (ref 0–0.2)
PLATELET # BLD AUTO: 209 K/UL (ref 150–450)
PMV BLD AUTO: 10.8 FL (ref 9.4–12.3)
POTASSIUM SERPL-SCNC: 4.1 MMOL/L (ref 3.5–5.1)
PROT SERPL-MCNC: 7.1 G/DL (ref 6.3–8.2)
RBC # BLD AUTO: 6.18 M/UL (ref 4.23–5.6)
SODIUM SERPL-SCNC: 140 MMOL/L (ref 136–145)
TRIGL SERPL-MCNC: 158 MG/DL (ref 0–150)
VLDLC SERPL CALC-MCNC: 32 MG/DL (ref 6–23)
WBC # BLD AUTO: 6.7 K/UL (ref 4.3–11.1)

## 2024-12-12 LAB — TSH, 3RD GENERATION: 3.07 UIU/ML (ref 0.27–4.2)

## 2024-12-19 ENCOUNTER — OFFICE VISIT (OUTPATIENT)
Dept: INTERNAL MEDICINE CLINIC | Facility: CLINIC | Age: 79
End: 2024-12-19
Payer: MEDICARE

## 2024-12-19 VITALS
WEIGHT: 163 LBS | HEART RATE: 93 BPM | TEMPERATURE: 98.8 F | DIASTOLIC BLOOD PRESSURE: 70 MMHG | OXYGEN SATURATION: 94 % | BODY MASS INDEX: 25.58 KG/M2 | SYSTOLIC BLOOD PRESSURE: 118 MMHG | HEIGHT: 67 IN

## 2024-12-19 DIAGNOSIS — E78.5 HYPERLIPIDEMIA, UNSPECIFIED HYPERLIPIDEMIA TYPE: ICD-10-CM

## 2024-12-19 DIAGNOSIS — R79.9 ABNORMAL FINDING OF BLOOD CHEMISTRY, UNSPECIFIED: ICD-10-CM

## 2024-12-19 DIAGNOSIS — E05.00 GRAVES' DISEASE: ICD-10-CM

## 2024-12-19 DIAGNOSIS — R73.01 IFG (IMPAIRED FASTING GLUCOSE): ICD-10-CM

## 2024-12-19 DIAGNOSIS — D58.2 HEMOGLOBINOPATHY (HCC): Primary | ICD-10-CM

## 2024-12-19 DIAGNOSIS — E78.2 MIXED HYPERLIPIDEMIA: ICD-10-CM

## 2024-12-19 PROCEDURE — G8484 FLU IMMUNIZE NO ADMIN: HCPCS | Performed by: INTERNAL MEDICINE

## 2024-12-19 PROCEDURE — 4004F PT TOBACCO SCREEN RCVD TLK: CPT | Performed by: INTERNAL MEDICINE

## 2024-12-19 PROCEDURE — G8427 DOCREV CUR MEDS BY ELIG CLIN: HCPCS | Performed by: INTERNAL MEDICINE

## 2024-12-19 PROCEDURE — 1159F MED LIST DOCD IN RCRD: CPT | Performed by: INTERNAL MEDICINE

## 2024-12-19 PROCEDURE — 1123F ACP DISCUSS/DSCN MKR DOCD: CPT | Performed by: INTERNAL MEDICINE

## 2024-12-19 PROCEDURE — 99214 OFFICE O/P EST MOD 30 MIN: CPT | Performed by: INTERNAL MEDICINE

## 2024-12-19 PROCEDURE — G8419 CALC BMI OUT NRM PARAM NOF/U: HCPCS | Performed by: INTERNAL MEDICINE

## 2024-12-19 ASSESSMENT — ENCOUNTER SYMPTOMS
COLOR CHANGE: 0
DIARRHEA: 1
CONSTIPATION: 0
NAUSEA: 0
SHORTNESS OF BREATH: 0
SINUS PAIN: 0
ABDOMINAL PAIN: 0
WHEEZING: 0
VOMITING: 0

## 2024-12-19 NOTE — PROGRESS NOTES
Ferritin; Future  -     Albumin/Creatinine Ratio, Urine; Future  -     TSH; Future  -     Lipid Panel; Future  -     Hemoglobin A1C; Future  -     Comprehensive Metabolic Panel; Future  -     T4, Free; Future    Abnormal finding of blood chemistry, unspecified  -     Iron; Future  -     Ferritin; Future                 Meng Yun DO

## 2025-04-01 ENCOUNTER — OFFICE VISIT (OUTPATIENT)
Dept: INTERNAL MEDICINE CLINIC | Facility: CLINIC | Age: 80
End: 2025-04-01

## 2025-04-01 VITALS
DIASTOLIC BLOOD PRESSURE: 70 MMHG | SYSTOLIC BLOOD PRESSURE: 120 MMHG | BODY MASS INDEX: 26.53 KG/M2 | TEMPERATURE: 99 F | HEART RATE: 83 BPM | OXYGEN SATURATION: 93 % | HEIGHT: 67 IN | WEIGHT: 169 LBS

## 2025-04-01 DIAGNOSIS — R31.9 HEMATURIA, UNSPECIFIED TYPE: Primary | ICD-10-CM

## 2025-04-01 LAB
BILIRUBIN, URINE, POC: NEGATIVE
BLOOD URINE, POC: NEGATIVE
GLUCOSE URINE, POC: NEGATIVE
KETONES, URINE, POC: NEGATIVE
LEUKOCYTE ESTERASE, URINE, POC: NEGATIVE
NITRITE, URINE, POC: NEGATIVE
PH, URINE, POC: 6 (ref 4.6–8)
PROTEIN,URINE, POC: NEGATIVE
SPECIFIC GRAVITY, URINE, POC: 1.01 (ref 1–1.03)
URINALYSIS CLARITY, POC: CLEAR
URINALYSIS COLOR, POC: YELLOW
UROBILINOGEN, POC: NORMAL

## 2025-04-01 SDOH — ECONOMIC STABILITY: FOOD INSECURITY: WITHIN THE PAST 12 MONTHS, THE FOOD YOU BOUGHT JUST DIDN'T LAST AND YOU DIDN'T HAVE MONEY TO GET MORE.: NEVER TRUE

## 2025-04-01 SDOH — ECONOMIC STABILITY: FOOD INSECURITY: WITHIN THE PAST 12 MONTHS, YOU WORRIED THAT YOUR FOOD WOULD RUN OUT BEFORE YOU GOT MONEY TO BUY MORE.: NEVER TRUE

## 2025-04-01 ASSESSMENT — PATIENT HEALTH QUESTIONNAIRE - PHQ9
SUM OF ALL RESPONSES TO PHQ QUESTIONS 1-9: 0
SUM OF ALL RESPONSES TO PHQ QUESTIONS 1-9: 0
2. FEELING DOWN, DEPRESSED OR HOPELESS: NOT AT ALL
1. LITTLE INTEREST OR PLEASURE IN DOING THINGS: NOT AT ALL
SUM OF ALL RESPONSES TO PHQ QUESTIONS 1-9: 0
SUM OF ALL RESPONSES TO PHQ QUESTIONS 1-9: 0

## 2025-04-01 NOTE — PROGRESS NOTES
applicable) and other individuals in attendance with the patient were advised that Artificial Intelligence will be utilized during this visit to record, process the conversation to generate a clinical note, and support improvement of the AI technology. The patient (or guardian, if applicable) and other individuals in attendance at the appointment consented to the use of AI, including the recording.        An electronic signature was used to authenticate this note.  Meng Yun, DO  
No

## 2025-04-07 ENCOUNTER — HOSPITAL ENCOUNTER (OUTPATIENT)
Dept: CT IMAGING | Age: 80
Discharge: HOME | End: 2025-04-10
Attending: INTERNAL MEDICINE
Payer: MEDICARE

## 2025-04-07 DIAGNOSIS — R31.9 HEMATURIA, UNSPECIFIED TYPE: ICD-10-CM

## 2025-04-07 PROCEDURE — 74176 CT ABD & PELVIS W/O CONTRAST: CPT

## 2025-04-08 ENCOUNTER — TELEPHONE (OUTPATIENT)
Dept: UROLOGY | Age: 80
End: 2025-04-08

## 2025-04-08 NOTE — TELEPHONE ENCOUNTER
Pt called in requesting to push this appt time off to 10:50AM. Pt received NP paperwork in the mail and stated that the arrival time shows 10:10AM and the appt time shows 10:30AM. Pt stated that he had something else to do and would like to push appt time off to 10:50AM instead of 10:30AM. Pt offered to come in a day early to get the NP paperwork up loaded in the system and to get insurance verified and just arrive at 10:50AM for the appt. Pt was advised that he was welcome to come early to get things uploaded and verified but the appt arrival time and appt time would not change unless we reschedule the appt. Pt understood that the appt is scheduled for 10:30AM on 04/25/2025 and states he will try to make it work.

## 2025-04-11 ENCOUNTER — TELEPHONE (OUTPATIENT)
Dept: UROLOGY | Age: 80
End: 2025-04-11

## 2025-04-11 NOTE — TELEPHONE ENCOUNTER
Pt left vm stating appt needs to be made at a later date, call was returned but phone was out of service

## 2025-04-25 ENCOUNTER — TELEPHONE (OUTPATIENT)
Dept: UROLOGY | Age: 80
End: 2025-04-25

## 2025-04-25 ENCOUNTER — RESULTS FOLLOW-UP (OUTPATIENT)
Dept: UROLOGY | Age: 80
End: 2025-04-25

## 2025-04-25 ENCOUNTER — OFFICE VISIT (OUTPATIENT)
Dept: UROLOGY | Age: 80
End: 2025-04-25
Payer: MEDICARE

## 2025-04-25 DIAGNOSIS — R35.0 BENIGN PROSTATIC HYPERPLASIA WITH URINARY FREQUENCY: ICD-10-CM

## 2025-04-25 DIAGNOSIS — R31.29 MICROSCOPIC HEMATURIA: Primary | ICD-10-CM

## 2025-04-25 DIAGNOSIS — N40.1 BENIGN PROSTATIC HYPERPLASIA WITH URINARY FREQUENCY: ICD-10-CM

## 2025-04-25 DIAGNOSIS — R31.0 GROSS HEMATURIA: ICD-10-CM

## 2025-04-25 LAB
BILIRUBIN, URINE, POC: NORMAL
BLOOD URINE, POC: NEGATIVE
GLUCOSE URINE, POC: NEGATIVE MG/DL
KETONES, URINE, POC: NEGATIVE MG/DL
LEUKOCYTE ESTERASE, URINE, POC: NEGATIVE
NITRITE, URINE, POC: NEGATIVE
PH, URINE, POC: 7 (ref 4.6–8)
PROTEIN,URINE, POC: 30 MG/DL
PSA SERPL-MCNC: 4.8 NG/ML (ref 0–4)
SPECIFIC GRAVITY, URINE, POC: 1.01 (ref 1–1.03)
URINALYSIS CLARITY, POC: NORMAL
URINALYSIS COLOR, POC: NORMAL
UROBILINOGEN, POC: NORMAL MG/DL

## 2025-04-25 PROCEDURE — 81003 URINALYSIS AUTO W/O SCOPE: CPT | Performed by: UROLOGY

## 2025-04-25 PROCEDURE — 1123F ACP DISCUSS/DSCN MKR DOCD: CPT | Performed by: UROLOGY

## 2025-04-25 PROCEDURE — 99204 OFFICE O/P NEW MOD 45 MIN: CPT | Performed by: UROLOGY

## 2025-04-25 PROCEDURE — 1159F MED LIST DOCD IN RCRD: CPT | Performed by: UROLOGY

## 2025-04-25 PROCEDURE — G8427 DOCREV CUR MEDS BY ELIG CLIN: HCPCS | Performed by: UROLOGY

## 2025-04-25 PROCEDURE — 4004F PT TOBACCO SCREEN RCVD TLK: CPT | Performed by: UROLOGY

## 2025-04-25 PROCEDURE — G8419 CALC BMI OUT NRM PARAM NOF/U: HCPCS | Performed by: UROLOGY

## 2025-04-25 ASSESSMENT — ENCOUNTER SYMPTOMS
EYE PAIN: 0
VOMITING: 0
BACK PAIN: 0
DIARRHEA: 0
CONSTIPATION: 0
COUGH: 0
SKIN LESIONS: 0
BLOOD IN STOOL: 0
INDIGESTION: 0
WHEEZING: 0
NAUSEA: 0
SHORTNESS OF BREATH: 0
ABDOMINAL PAIN: 0
EYE DISCHARGE: 0
HEARTBURN: 0

## 2025-04-25 NOTE — RESULT ENCOUNTER NOTE
Mr. Rooney, your PSA test did come back slightly elevated at 4.8 today.  I recommend that we proceed with your cystoscopy as scheduled and recheck it when you come in at that time.  If still elevated, then we may need to investigate further with a MRI of the prostate    Best Regards,  Dr. Ortiz

## 2025-04-26 NOTE — PROGRESS NOTES
[facility]  [address]   [address]  [place] [address]  [phone]    Eloy Rooney  : 1945    Chief Complaint   Patient presents with    New Patient    Hematuria          HPI     Eloy Rooney is a 79 y.o. male    History of Present Illness  The patient is a 79-year-old male referred to the clinic for evaluation of gross hematuria. He is accompanied by his wife.    Approximately a month ago, he observed a change in the color of his urine. He has a history of kidney stones, with the most recent episode occurring 7 years ago. He utilized a strainer and noticed the passage of a small, hair-like object, which he subsequently presented to Dr. Petty.     A CT A/P: scan was performed, revealing no evidence of kidney stones. It did show an enlarged prostate and possible mass in the bladder that needs further evaluation with cystoscope.  He has not had a recent PSA test. He does have a history of kidney stones, but the CT in 2025 did not show any kidney stones.     Urine test today is negative for blood or infection. He reports passing some blood clots in his urine over the past month but has not had a hematuria workup. He has an upcoming appointment with Dr. Petty on  at 1:15 PM, during which no blood work is scheduled.     He has a significant history of heavy smoking, having quit for approximately 6 years before resuming due to anxiety. 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    SOCIAL HISTORY  The patient has a history of heavy smoking but had quit for about 6 years and recently started again due to anxiety.    FAMILY HISTORY  The patient reports no family history of bladder cancer, kidney cancer, or prostate cancer. His father  at 52 and his mother  at 96.    Lab Results   Component Value Date    PSA 4.8 (H) 2025           Past Medical History:   Diagnosis Date    Graves' disease

## 2025-04-29 ENCOUNTER — OFFICE VISIT (OUTPATIENT)
Dept: INTERNAL MEDICINE CLINIC | Facility: CLINIC | Age: 80
End: 2025-04-29

## 2025-04-29 VITALS
SYSTOLIC BLOOD PRESSURE: 138 MMHG | HEART RATE: 88 BPM | OXYGEN SATURATION: 96 % | BODY MASS INDEX: 26.37 KG/M2 | DIASTOLIC BLOOD PRESSURE: 80 MMHG | HEIGHT: 67 IN | TEMPERATURE: 99 F | WEIGHT: 168 LBS

## 2025-04-29 DIAGNOSIS — E05.90 HYPERTHYROIDISM: ICD-10-CM

## 2025-04-29 DIAGNOSIS — E78.2 MIXED HYPERLIPIDEMIA: ICD-10-CM

## 2025-04-29 DIAGNOSIS — H91.91 HEARING LOSS OF RIGHT EAR, UNSPECIFIED HEARING LOSS TYPE: ICD-10-CM

## 2025-04-29 DIAGNOSIS — R97.20 PSA ELEVATION: ICD-10-CM

## 2025-04-29 DIAGNOSIS — R31.9 HEMATURIA, UNSPECIFIED TYPE: Primary | ICD-10-CM

## 2025-04-29 DIAGNOSIS — E78.5 HYPERLIPIDEMIA, UNSPECIFIED HYPERLIPIDEMIA TYPE: ICD-10-CM

## 2025-04-29 DIAGNOSIS — R73.01 IFG (IMPAIRED FASTING GLUCOSE): ICD-10-CM

## 2025-04-29 NOTE — PROGRESS NOTES
Normal breath sounds.   Abdominal:      General: Abdomen is flat. Bowel sounds are normal.      Palpations: Abdomen is soft.   Musculoskeletal:      Right lower leg: No edema.      Left lower leg: No edema.   Skin:     General: Skin is dry.      Coloration: Skin is not jaundiced.   Neurological:      Mental Status: He is alert.   Psychiatric:         Mood and Affect: Mood and affect normal.         Thought Content: Thought content normal.         Cognition and Memory: Memory normal.       Eloy was seen today for follow-up.    Diagnoses and all orders for this visit:    Hematuria, unspecified type    PSA elevation    Hearing loss of right ear, unspecified hearing loss type  -     [facility] (Audiology)[facility]    Hyperthyroidism  -     Lipid Panel; Future  -     Hemoglobin A1C; Future  -     TSH; Future  -     Comprehensive Metabolic Panel; Future  -     CBC; Future    Hyperlipidemia, unspecified hyperlipidemia type  -     Lipid Panel; Future  -     Hemoglobin A1C; Future  -     TSH; Future  -     Comprehensive Metabolic Panel; Future  -     CBC; Future    IFG (impaired fasting glucose)  -     Lipid Panel; Future  -     Hemoglobin A1C; Future  -     TSH; Future  -     Comprehensive Metabolic Panel; Future  -     CBC; Future    Mixed hyperlipidemia  -     Lipid Panel; Future  -     Hemoglobin A1C; Future  -     TSH; Future  -     Comprehensive Metabolic Panel; Future  -     CBC; Future        The patient (or guardian, if applicable) and other individuals in attendance with the patient were advised that Artificial Intelligence will be utilized during this visit to record, process the conversation to generate a clinical note, and support improvement of the AI technology. The patient (or guardian, if applicable) and other individuals in attendance at the appointment consented to the use of AI, including the recording.        An electronic signature was used to authenticate this note.  Meng Yun DO

## 2025-05-12 ENCOUNTER — OFFICE VISIT (OUTPATIENT)
Dept: AUDIOLOGY | Age: 80
End: 2025-05-12
Payer: MEDICARE

## 2025-05-12 DIAGNOSIS — H90.3 SENSORINEURAL HEARING LOSS, BILATERAL: Primary | ICD-10-CM

## 2025-05-12 PROCEDURE — 92557 COMPREHENSIVE HEARING TEST: CPT | Performed by: AUDIOLOGIST

## 2025-05-12 NOTE — PROGRESS NOTES
AUDIOLOGY EVALUATION    Eloy Rooney had Audiometry performed today.    The patient reports hearing loss (worse in his left ear).     Results as follows:    Audiometry    Test Performed - Comprehensive Audiogram    Type of Loss - Right Ear: abnormal hearing: degree of loss is normal to profound sensorineural hearing loss                           Left Ear: abnormal hearing: degree of loss is normal to profound sensorineural hearing loss     SRT   Measurement Right Ear Left Ear   Value 35 35   Unit dB dB     Discrimination  Measurement Right Ear Left Ear   Value 60% 44%   Unit dB dB     Recommend  Binaural amplification and annual audios    Tyra Davis [facility]3]-A  Audiologist   ambulatory

## 2025-05-22 ENCOUNTER — PROCEDURE VISIT (OUTPATIENT)
Dept: UROLOGY | Age: 80
End: 2025-05-22

## 2025-05-22 ENCOUNTER — TELEPHONE (OUTPATIENT)
Dept: REHABILITATION | Age: 80
End: 2025-05-22

## 2025-05-22 DIAGNOSIS — N40.1 BENIGN PROSTATIC HYPERPLASIA WITH URINARY FREQUENCY: Primary | ICD-10-CM

## 2025-05-22 DIAGNOSIS — N32.89 BLADDER MASS: ICD-10-CM

## 2025-05-22 DIAGNOSIS — R97.20 ELEVATED PSA: ICD-10-CM

## 2025-05-22 DIAGNOSIS — R31.0 GROSS HEMATURIA: ICD-10-CM

## 2025-05-22 DIAGNOSIS — Z71.6 ENCOUNTER FOR SMOKING CESSATION COUNSELING: ICD-10-CM

## 2025-05-22 DIAGNOSIS — R35.0 BENIGN PROSTATIC HYPERPLASIA WITH URINARY FREQUENCY: Primary | ICD-10-CM

## 2025-05-22 LAB
BILIRUBIN, URINE, POC: NEGATIVE
BLOOD URINE, POC: NORMAL
GLUCOSE URINE, POC: NEGATIVE MG/DL
KETONES, URINE, POC: NEGATIVE MG/DL
LEUKOCYTE ESTERASE, URINE, POC: NEGATIVE
NITRITE, URINE, POC: NEGATIVE
PH, URINE, POC: 7 (ref 4.6–8)
PROTEIN,URINE, POC: NEGATIVE MG/DL
SPECIFIC GRAVITY, URINE, POC: 1.01 (ref 1–1.03)
URINALYSIS CLARITY, POC: NORMAL
URINALYSIS COLOR, POC: NORMAL
UROBILINOGEN, POC: NORMAL MG/DL

## 2025-05-22 ASSESSMENT — HOOS JR
BENDING TO THE FLOOR TO PICK UP OBJECT: SEVERE
SITTING: SEVERE
HOOS JR RAW SCORE: 14
HOOS JR TOTAL INTERVAL SCORE: 46.652
LYING IN BED (TURNING OVER, MAINTAINING HIP POSITION): MODERATE
RISING FROM SITTING: MODERATE
WALKING ON UNEVEN SURFACE: MODERATE
HOOS JR RAW SCORE: 14
GOING UP OR DOWN STAIRS: MODERATE

## 2025-05-22 NOTE — PROGRESS NOTES
[facility]  [address]   [address]  [place] [address]  [phone]    Eloy Rooney  : 1945         HPI   79 y.o., male who presents for cystoscopy for evaluation of gross hematuria.     Referred to the clinic for evaluation of gross hematuria. He is accompanied by his wife.     Approximately a month ago, he observed a change in the color of his urine. He has a history of kidney stones, with the most recent episode occurring 7 years ago. He utilized a strainer and noticed the passage of a small, hair-like object, which he subsequently presented to Dr. Petty.      A CT A/P: scan was performed, revealing no evidence of kidney stones. It did show an enlarged prostate and possible mass in the bladder that needs further evaluation with cystoscope.  He has not had a recent PSA test. He does have a history of kidney stones, but the CT in 2025 did not show any kidney stones.      Urine test today is negative for blood or infection. He reports passing some blood clots in his urine over the past month but has not had a hematuria workup.      He has a significant history of heavy smoking, having quit for approximately 6 years before resuming due to anxiety. 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     SOCIAL HISTORY  The patient has a history of heavy smoking but had quit for about 6 years and recently started again due to anxiety.     FAMILY HISTORY  The patient reports no family history of bladder cancer, kidney cancer, or prostate cancer. His father  at 52 and his mother  at 96.    Lab Results   Component Value Date    PSA 4.8 (H) 2025       Past Medical History:   Diagnosis Date    Graves' disease     Hearing loss     left ear    Hyperlipidemia     no meds    Hyperthyroidism     Lumbar radiculopathy     Polycythemia     Primary osteoarthritis of right hip     Vision impairment     \"splintered vision\" r/t

## 2025-05-23 ENCOUNTER — TELEPHONE (OUTPATIENT)
Dept: UROLOGY | Age: 80
End: 2025-05-23

## 2025-05-23 NOTE — TELEPHONE ENCOUNTER
----- Message from Dr. Mika Ortiz MD sent at 5/22/2025  4:16 PM EDT -----  Regarding: SUrgery [facility]  Hospital: [facility]    Surgeon: Diana    Assist: NONE    Diagnosis: Bladder Mass    Procedure: Cystoscopy, Monopolar trans-urethral resection of bladder tumor, instillation of intra-vesical chemotherapy    Posting time: 1 hour    Special Instruments Needed:  NONE    Anesthesia: GENERAL    Labs: CBC, BMP, U/A    Tests: EKG per anesthesia    Blood: NONE    Bowel Prep: NONE    Special Instructions: need tube for anesthesia, needs MRI prostate prior to surgery    Orders/HandP:     Follow up appointment: TBD

## 2025-05-27 ENCOUNTER — HOSPITAL ENCOUNTER (OUTPATIENT)
Dept: MRI IMAGING | Age: 80
Discharge: HOME | End: 2025-05-29
Attending: UROLOGY
Payer: MEDICARE

## 2025-05-27 DIAGNOSIS — R97.20 ELEVATED PSA: ICD-10-CM

## 2025-05-27 LAB
CYTOLOGY-NON GYN: NORMAL
SPECIMEN SOURCE: NORMAL

## 2025-05-27 PROCEDURE — 72197 MRI PELVIS W/O & W/DYE: CPT

## 2025-05-27 PROCEDURE — A9579 GAD-BASE MR CONTRAST NOS,1ML: HCPCS | Performed by: UROLOGY

## 2025-05-27 PROCEDURE — 6360000004 HC RX CONTRAST MEDICATION: Performed by: UROLOGY

## 2025-05-27 RX ADMIN — GADOTERIDOL 15 ML: 279.3 INJECTION, SOLUTION INTRAVENOUS at 19:47

## 2025-05-29 ENCOUNTER — RESULTS FOLLOW-UP (OUTPATIENT)
Dept: UROLOGY | Age: 80
End: 2025-05-29

## 2025-05-29 DIAGNOSIS — R97.20 ELEVATED PSA: Primary | ICD-10-CM

## 2025-05-29 RX ORDER — CIPROFLOXACIN 500 MG/1
500 TABLET, FILM COATED ORAL 2 TIMES DAILY
Qty: 2 TABLET | Refills: 0 | Status: SHIPPED | OUTPATIENT
Start: 2025-05-29 | End: 2025-05-30

## 2025-05-29 RX ORDER — SODIUM PHOSPHATE, DIBASIC AND SODIUM PHOSPHATE, MONOBASIC 7; 19 G/230ML; G/230ML
1 ENEMA RECTAL ONCE
Qty: 1 ENEMA | Refills: 0 | Status: SHIPPED | OUTPATIENT
Start: 2025-05-29 | End: 2025-05-29

## 2025-05-29 NOTE — TELEPHONE ENCOUNTER
Mika Ortiz MD Col[place], Melissa Mason  Mr. Rooney, I reviewed your MRI prostate.  There is an area at the left base of the prostate suspicious for possible prostate cancer in addition to the suspected bladder cancer in your bladder.  I recommend that we add a MRI fusion prostate biopsy onto your bladder procedure and do both at the same time.  It will take about 15 extra minutes to do the prostate biopsy while we are there.  I tried to call you today to discuss but the phone said that your numbers had been disconnected and the other number kept giving a busy signal.    My  Daylin will reach out to you about getting this prostate biopsy set up in addition to the bladder procedure.       Daylin, please add on MRI fusion prostate biopsy to his TURBT surgery.  I sent Cipro/enema to his pharmacy today.    Best Regards,  Dr. Ortiz

## 2025-05-29 NOTE — RESULT ENCOUNTER NOTE
Mr. Rooney, I reviewed your MRI prostate.  There is an area at the left base of the prostate suspicious for possible prostate cancer in addition to the suspected bladder cancer in your bladder.  I recommend that we add a MRI fusion prostate biopsy onto your bladder procedure and do both at the same time.  It will take about 15 extra minutes to do the prostate biopsy while we are there.  I tried to call you today to discuss but the phone said that your numbers had been disconnected and the other number kept giving a busy signal.     My  Daylin will reach out to you about getting this prostate biopsy set up in addition to the bladder procedure.       Daylin, please add on MRI fusion prostate biopsy to his TURBT surgery.  I sent Cipro/enema to his pharmacy today.     Best Regards,  Dr. Ortiz

## 2025-05-29 NOTE — RESULT ENCOUNTER NOTE
Mr. Rooney, your urine cytology returned with no cancer cells but given the masses seen on cystoscopy that are very suspicious for bladder cancer, I do recommend that we proceed with your surgery as we discussed at your last visit.     Best Regards,  Dr. Ortiz

## 2025-06-11 NOTE — TELEPHONE ENCOUNTER
I left a detailed voicemail for pt to call me back to schedule surgery.  I have time held for him 7/1 @ 11 am.

## 2025-06-18 NOTE — TELEPHONE ENCOUNTER
Spoke with daughter today that gave me an alternate number to reach this patient.  I called and spoke with him about scheduling his surgery.  He said that  now he is under Dr. Barraza's care and that he would like for you to call him.  [place] get [place] hold of.  His home number is [phone] or cell is [phone].

## 2025-06-19 ENCOUNTER — TELEPHONE (OUTPATIENT)
Dept: UROLOGY | Age: 80
End: 2025-06-19

## 2025-06-19 NOTE — TELEPHONE ENCOUNTER
Attempted to call patient back and got voicemail to address his concerns.     He has decided to transfer his care to Dr. Barraza at [facility] at this time and will have further follow up with them.      He will see me PRN. I left him my contact information should he require additional assistance.     Mika Ortiz M.D.    [facility]  [address] [address]  Phone: [phone]  Fax: [phone]

## 2025-06-20 ENCOUNTER — RESULTS FOLLOW-UP (OUTPATIENT)
Dept: INTERNAL MEDICINE CLINIC | Facility: CLINIC | Age: 80
End: 2025-06-20

## 2025-06-20 ENCOUNTER — LAB (OUTPATIENT)
Dept: INTERNAL MEDICINE CLINIC | Facility: CLINIC | Age: 80
End: 2025-06-20

## 2025-06-20 DIAGNOSIS — R73.01 IFG (IMPAIRED FASTING GLUCOSE): ICD-10-CM

## 2025-06-20 DIAGNOSIS — E78.2 MIXED HYPERLIPIDEMIA: ICD-10-CM

## 2025-06-20 DIAGNOSIS — D58.2 HEMOGLOBINOPATHY: ICD-10-CM

## 2025-06-20 DIAGNOSIS — E05.00 GRAVES' DISEASE: ICD-10-CM

## 2025-06-20 DIAGNOSIS — E78.5 HYPERLIPIDEMIA, UNSPECIFIED HYPERLIPIDEMIA TYPE: ICD-10-CM

## 2025-06-20 LAB
ALBUMIN SERPL-MCNC: 4.2 G/DL (ref 3.2–4.6)
ALBUMIN/GLOB SERPL: 1.2 (ref 1–1.9)
ALP SERPL-CCNC: 92 U/L (ref 40–129)
ALT SERPL-CCNC: 25 U/L (ref 8–55)
ANION GAP SERPL CALC-SCNC: 12 MMOL/L (ref 7–16)
AST SERPL-CCNC: 36 U/L (ref 15–37)
BILIRUB SERPL-MCNC: 0.8 MG/DL (ref 0–1.2)
BUN SERPL-MCNC: 7 MG/DL (ref 8–23)
CALCIUM SERPL-MCNC: 9.7 MG/DL (ref 8.8–10.2)
CHLORIDE SERPL-SCNC: 100 MMOL/L (ref 98–107)
CHOLEST SERPL-MCNC: 215 MG/DL (ref 0–200)
CO2 SERPL-SCNC: 27 MMOL/L (ref 20–29)
CREAT SERPL-MCNC: 0.76 MG/DL (ref 0.8–1.3)
CREAT UR-MCNC: 87.6 MG/DL (ref 39–259)
EST. AVERAGE GLUCOSE BLD GHB EST-MCNC: 105 MG/DL
GLOBULIN SER CALC-MCNC: 3.4 G/DL (ref 2.3–3.5)
GLUCOSE SERPL-MCNC: 100 MG/DL (ref 70–99)
HBA1C MFR BLD: 5.3 % (ref 0–5.6)
HDLC SERPL-MCNC: 64 MG/DL (ref 40–60)
HDLC SERPL: 3.4 (ref 0–5)
LDLC SERPL CALC-MCNC: 111 MG/DL (ref 0–100)
MICROALBUMIN UR-MCNC: 6.25 MG/DL (ref 0–20)
MICROALBUMIN/CREAT UR-RTO: 71 MG/G (ref 0–30)
POTASSIUM SERPL-SCNC: 4.4 MMOL/L (ref 3.5–5.1)
PROT SERPL-MCNC: 7.6 G/DL (ref 6.3–8.2)
SODIUM SERPL-SCNC: 139 MMOL/L (ref 136–145)
T4 FREE SERPL-MCNC: 1.2 NG/DL (ref 0.9–1.7)
TRIGL SERPL-MCNC: 199 MG/DL (ref 0–150)
TSH, 3RD GENERATION: 1.64 UIU/ML (ref 0.27–4.2)
VLDLC SERPL CALC-MCNC: 40 MG/DL (ref 6–23)

## 2025-06-23 ENCOUNTER — OFFICE VISIT (OUTPATIENT)
Dept: INTERNAL MEDICINE CLINIC | Facility: CLINIC | Age: 80
End: 2025-06-23
Payer: MEDICARE

## 2025-06-23 ENCOUNTER — OFFICE VISIT (OUTPATIENT)
Dept: ENT CLINIC | Age: 80
End: 2025-06-23
Payer: MEDICARE

## 2025-06-23 VITALS — BODY MASS INDEX: 26.21 KG/M2 | HEIGHT: 67 IN | RESPIRATION RATE: 17 BRPM | WEIGHT: 167 LBS

## 2025-06-23 VITALS
TEMPERATURE: 97.6 F | DIASTOLIC BLOOD PRESSURE: 78 MMHG | HEIGHT: 67 IN | BODY MASS INDEX: 25.74 KG/M2 | HEART RATE: 93 BPM | WEIGHT: 164 LBS | SYSTOLIC BLOOD PRESSURE: 128 MMHG | OXYGEN SATURATION: 93 %

## 2025-06-23 DIAGNOSIS — E05.90 HYPERTHYROIDISM: ICD-10-CM

## 2025-06-23 DIAGNOSIS — L29.9 EAR ITCH: Primary | ICD-10-CM

## 2025-06-23 DIAGNOSIS — R31.9 HEMATURIA, UNSPECIFIED TYPE: Primary | ICD-10-CM

## 2025-06-23 DIAGNOSIS — H61.22 IMPACTED CERUMEN OF LEFT EAR: ICD-10-CM

## 2025-06-23 DIAGNOSIS — E78.2 MIXED HYPERLIPIDEMIA: ICD-10-CM

## 2025-06-23 DIAGNOSIS — N32.89 BLADDER MASS: ICD-10-CM

## 2025-06-23 DIAGNOSIS — R73.01 IFG (IMPAIRED FASTING GLUCOSE): ICD-10-CM

## 2025-06-23 LAB
BILIRUBIN, URINE, POC: NEGATIVE
BLOOD URINE, POC: NORMAL
GLUCOSE URINE, POC: NEGATIVE
KETONES, URINE, POC: NORMAL
LEUKOCYTE ESTERASE, URINE, POC: NEGATIVE
NITRITE, URINE, POC: NEGATIVE
PH, URINE, POC: 7 (ref 4.6–8)
PROTEIN,URINE, POC: NORMAL
SPECIFIC GRAVITY, URINE, POC: 1.01 (ref 1–1.03)
URINALYSIS CLARITY, POC: CLEAR
URINALYSIS COLOR, POC: NORMAL
UROBILINOGEN, POC: NORMAL

## 2025-06-23 PROCEDURE — 4004F PT TOBACCO SCREEN RCVD TLK: CPT | Performed by: OTOLARYNGOLOGY

## 2025-06-23 PROCEDURE — G8419 CALC BMI OUT NRM PARAM NOF/U: HCPCS | Performed by: INTERNAL MEDICINE

## 2025-06-23 PROCEDURE — 99214 OFFICE O/P EST MOD 30 MIN: CPT | Performed by: INTERNAL MEDICINE

## 2025-06-23 PROCEDURE — 1159F MED LIST DOCD IN RCRD: CPT | Performed by: OTOLARYNGOLOGY

## 2025-06-23 PROCEDURE — 1123F ACP DISCUSS/DSCN MKR DOCD: CPT | Performed by: INTERNAL MEDICINE

## 2025-06-23 PROCEDURE — 99213 OFFICE O/P EST LOW 20 MIN: CPT | Performed by: OTOLARYNGOLOGY

## 2025-06-23 PROCEDURE — 4004F PT TOBACCO SCREEN RCVD TLK: CPT | Performed by: INTERNAL MEDICINE

## 2025-06-23 PROCEDURE — 1123F ACP DISCUSS/DSCN MKR DOCD: CPT | Performed by: OTOLARYNGOLOGY

## 2025-06-23 PROCEDURE — G8427 DOCREV CUR MEDS BY ELIG CLIN: HCPCS | Performed by: INTERNAL MEDICINE

## 2025-06-23 PROCEDURE — 81003 URINALYSIS AUTO W/O SCOPE: CPT | Performed by: INTERNAL MEDICINE

## 2025-06-23 PROCEDURE — 1159F MED LIST DOCD IN RCRD: CPT | Performed by: INTERNAL MEDICINE

## 2025-06-23 PROCEDURE — G2211 COMPLEX E/M VISIT ADD ON: HCPCS | Performed by: INTERNAL MEDICINE

## 2025-06-23 PROCEDURE — G8427 DOCREV CUR MEDS BY ELIG CLIN: HCPCS | Performed by: OTOLARYNGOLOGY

## 2025-06-23 PROCEDURE — G8419 CALC BMI OUT NRM PARAM NOF/U: HCPCS | Performed by: OTOLARYNGOLOGY

## 2025-06-23 ASSESSMENT — ENCOUNTER SYMPTOMS
NAUSEA: 0
STRIDOR: 0
SINUS PRESSURE: 0
EYE ITCHING: 0
COUGH: 0
SHORTNESS OF BREATH: 0
WHEEZING: 0
DIARRHEA: 0
EYE DISCHARGE: 0
APNEA: 0
FACIAL SWELLING: 0
SINUS PAIN: 0
CONSTIPATION: 0
EYE PAIN: 0
CHOKING: 0

## 2025-06-23 NOTE — PROGRESS NOTES
Eloy Rooney (: 1945) is a 80 y.o. male, here for evaluation of the following chief complaint(s):  Follow-up (6 month check up and review labs lipids.)     Assessment & Plan  1. Bladder cancer.  - Urine analysis revealed the presence of 3+ blood, which is not unexpected given the patient's history.  - Scheduled surgery to remove the cancer from the bladder is planned for next Wednesday.  - The urologist will determine the next steps based on the biopsy results.  - Chemotherapy will be initiated post-surgery.    2. Suspected prostate cancer.  - An MRI fusion prostate biopsy has been recommended to further investigate the suspicious area on the left base of the prostate.  - Previous MRI indicated an area suspicious of possible prostate cancer.  - The patient has been advised to follow up with the urologist for this procedure.  - Prostate cancer management will be determined after bladder cancer treatment.    3. Graves' disease.  - Thyroid function is stable with a TSH level of 1.2.  - The patient has a long history of monitoring thyroid function.  - Continued monitoring of thyroid function is recommended.  - No current symptoms indicating thyroid dysfunction.    4. Hyperlipidemia.  - Cholesterol levels have improved from 232 to 215.  - HDL is at 64, which is above the desired level of 40.  - LDL is at 111, which is not considered terrible.  - Continued monitoring and management of cholesterol levels are recommended.    Follow-up  The patient will follow up in December.  1. Hematuria, unspecified type  Comments:  likely from prev dx bladder tumor-getting tx soon  Orders:  -     AMB POC URINALYSIS DIP STICK AUTO W/O MICRO  -     Lipid Panel; Future  -     TSH reflex to FT4; Future  -     Hemoglobin A1C; Future  -     Comprehensive Metabolic Panel; Future  2. Bladder mass  Comments:  see above  3. Hyperthyroidism  Comments:  doing good  Orders:  -     Lipid Panel; Future  -     TSH reflex to FT4; Future  -

## 2025-06-23 NOTE — PROGRESS NOTES
bruise/bleed easily.   Psychiatric/Behavioral:  Negative for agitation, behavioral problems and confusion.         PHYSICAL EXAM:    Resp 17   Ht 1.689 m (5' 6.5\")   Wt 75.8 kg (167 lb)   BMI 26.55 kg/m²     General: NAD, well-appearing  Neuro: No gross neuro deficits. CN's II-XII intact. No facial weakness.  Eyes: EOMI. Pupils reactive. No periorbital edema/ecchymosis. No nystagmus.  Skin: No facial erythema, rashes or concerning lesions.  Nose: No external deviations or saddling. Intranasally, septum is midline without perforations, nasal mucosa appears healthy with no erythema, mucopurulence, or polyps.  Mouth: Moist mucus membranes, normal tongue/palate mobility, no concerning mucosal lesions. Oropharynx clear with no erythema/exudate, no tonsillar hypertrophy.  Ears: Normal appearing auricles, no hematomas.  Right EAC is clear, there are several small pieces of dried cerumen along canal floor on left side which was carefully removed under microscopy, healthy canal skin, TM's intact with no perforations or retraction pockets. No middle ear effusions.  Neck: Soft, supple, no palpable lateral neck masses. No palpable parotid or submandibular masses. No thyromegaly or palpable thyroid nodules. No surgical scars.  Lymphatics: No palpable cervical LAD.  Resp: No audible stridor or wheezing.  CV: No murmurs, no JVD.  Extremities: No clubbing or cyanosis.        ASSESSMENT and PLAN      ICD-10-CM    1. Ear itch  L29.9       2. Impacted cerumen of left ear  H61.22           He had several small pieces of dried cerumen along the left EAC floor which were carefully removed under microscopy today.  I did not see any signs of infection on that side, and I asked him to use baby or mineral oil to help with ear itching and soften his cerumen naturally.  He will follow-up with one of our Audiologists to consider hearing aids, and I will see him back as needed for any future ear cleanings.     Jacky Mcgarry,

## (undated) DEVICE — STERILE PVP: Brand: MEDLINE INDUSTRIES, INC.

## (undated) DEVICE — SOLUTION IRRIG 3000ML 0.9% SOD CHL USP UROMATIC PLAS CONT

## (undated) DEVICE — APPLICATOR MEDICATED 26 CC SOLUTION HI LT ORNG CHLORAPREP

## (undated) DEVICE — GLOVE ORANGE PI 8   MSG9080

## (undated) DEVICE — SOLUTION IRRIG 1000ML 0.9% SOD CHL USP POUR PLAS BTL

## (undated) DEVICE — DRAPE,TOP,102X53,STERILE: Brand: MEDLINE

## (undated) DEVICE — COVER,MAYO STAND,XL,STERILE: Brand: MEDLINE

## (undated) DEVICE — GRIPPER SURGICAL RETRACTOR DISP

## (undated) DEVICE — SOLUTION IV 250ML 0.9% SOD CHL PH 5 INJ USP VIAFLX PLAS

## (undated) DEVICE — SOLUTION IRRIG 1000ML STRL H2O USP PLAS POUR BTL

## (undated) DEVICE — TOTAL HIP DR JENNINGS: Brand: MEDLINE INDUSTRIES, INC.

## (undated) DEVICE — STAPLER SKIN SQ 30 ABSRB STPL DISP INSORB ORDER VIA PHONE OR EMAIL

## (undated) DEVICE — SUTURE ETHIBOND EXCEL SZ 5 L30IN NONABSORBABLE GRN L40MM V-37 MB66G

## (undated) DEVICE — DRESSING HYDROFIBER AQUACEL AG ADVANTAGE 3.5X10 IN

## (undated) DEVICE — 450 ML BOTTLE OF 0.05% CHLORHEXIDINE GLUCONATE IN 99.95% STERILE WATER FOR IRRIGATION, USP AND APPLICATOR.: Brand: IRRISEPT ANTIMICROBIAL WOUND LAVAGE

## (undated) DEVICE — SUTURE ABS MF 2-0 CT1 27IN STRATAFIX PDS+ SXPP1B412

## (undated) DEVICE — HOOD: Brand: T7PLUS

## (undated) DEVICE — DUAL CUT SAGITTAL BLADE

## (undated) DEVICE — SUTURE ABSRB X-1 REV CUT 1/2 CIR 22MM UD BRAID 27IN SZ 3-0 J458H

## (undated) DEVICE — BIPOLAR SEALER 23-112-1 AQM 6.0: Brand: AQUAMANTYS ®

## (undated) DEVICE — GLOVE SURG SZ 8 L12IN FNGR THK79MIL GRN LTX FREE

## (undated) DEVICE — PIN FIX TROCAR PT 1 END 7/64X9 IN 1 PT STYL SMOOTH PLN STRL
Type: IMPLANTABLE DEVICE | Site: HIP | Status: NON-FUNCTIONAL
Removed: 2024-05-03

## (undated) DEVICE — SUTURE PDS II SZ 1 L36IN ABSRB VLT L48MM CTX 1/2 CIR Z371T

## (undated) DEVICE — YANKAUER,FLEXIBLE HANDLE,REGLR CAPACITY: Brand: MEDLINE INDUSTRIES, INC.

## (undated) DEVICE — SUTURE ABS ANTIBACT 1-0 CTX 24IN STRATAFIX PDS+ SXPP1A445